# Patient Record
Sex: FEMALE | Race: BLACK OR AFRICAN AMERICAN | Employment: FULL TIME | URBAN - METROPOLITAN AREA
[De-identification: names, ages, dates, MRNs, and addresses within clinical notes are randomized per-mention and may not be internally consistent; named-entity substitution may affect disease eponyms.]

---

## 2018-05-02 ENCOUNTER — OFFICE VISIT (OUTPATIENT)
Dept: INTERNAL MEDICINE | Age: 48
End: 2018-05-02

## 2018-05-02 ENCOUNTER — TELEPHONE (OUTPATIENT)
Dept: DERMATOLOGY | Age: 48
End: 2018-05-02

## 2018-05-02 VITALS
SYSTOLIC BLOOD PRESSURE: 132 MMHG | OXYGEN SATURATION: 98 % | HEIGHT: 62 IN | HEART RATE: 78 BPM | BODY MASS INDEX: 34.04 KG/M2 | DIASTOLIC BLOOD PRESSURE: 88 MMHG | TEMPERATURE: 98.4 F | WEIGHT: 185 LBS

## 2018-05-02 DIAGNOSIS — Z00.00 ROUTINE GENERAL MEDICAL EXAMINATION AT A HEALTH CARE FACILITY: Primary | ICD-10-CM

## 2018-05-02 DIAGNOSIS — B35.4 TINEA CORPORIS: ICD-10-CM

## 2018-05-02 DIAGNOSIS — L73.9 FOLLICULITIS: ICD-10-CM

## 2018-05-02 DIAGNOSIS — N39.3 STRESS INCONTINENCE: ICD-10-CM

## 2018-05-02 PROCEDURE — G8417 CALC BMI ABV UP PARAM F/U: HCPCS | Performed by: NURSE PRACTITIONER

## 2018-05-02 PROCEDURE — 99203 OFFICE O/P NEW LOW 30 MIN: CPT | Performed by: NURSE PRACTITIONER

## 2018-05-02 PROCEDURE — 99386 PREV VISIT NEW AGE 40-64: CPT | Performed by: NURSE PRACTITIONER

## 2018-05-02 PROCEDURE — G8427 DOCREV CUR MEDS BY ELIG CLIN: HCPCS | Performed by: NURSE PRACTITIONER

## 2018-05-02 PROCEDURE — 1036F TOBACCO NON-USER: CPT | Performed by: NURSE PRACTITIONER

## 2018-05-02 RX ORDER — TERBINAFINE HYDROCHLORIDE 250 MG/1
250 TABLET ORAL DAILY
Qty: 14 TABLET | Refills: 0 | Status: SHIPPED | OUTPATIENT
Start: 2018-05-02 | End: 2018-05-16

## 2018-05-02 RX ORDER — PRENATAL VIT 91/IRON/FOLIC/DHA 28-975-200
COMBINATION PACKAGE (EA) ORAL
Qty: 15 G | Refills: 0 | Status: SHIPPED | OUTPATIENT
Start: 2018-05-02 | End: 2022-09-20 | Stop reason: ALTCHOICE

## 2018-05-02 ASSESSMENT — PATIENT HEALTH QUESTIONNAIRE - PHQ9
1. LITTLE INTEREST OR PLEASURE IN DOING THINGS: 0
SUM OF ALL RESPONSES TO PHQ QUESTIONS 1-9: 0
2. FEELING DOWN, DEPRESSED OR HOPELESS: 0
SUM OF ALL RESPONSES TO PHQ9 QUESTIONS 1 & 2: 0

## 2018-05-03 ASSESSMENT — ENCOUNTER SYMPTOMS
DIARRHEA: 0
BLOOD IN STOOL: 0
BLURRED VISION: 0
VOMITING: 0
ORTHOPNEA: 0
EYE PAIN: 0
COUGH: 0
EYE DISCHARGE: 0
WHEEZING: 0
CONSTIPATION: 0
SINUS PAIN: 0
EYE REDNESS: 0
ABDOMINAL PAIN: 0
DOUBLE VISION: 0
HEMOPTYSIS: 0
SPUTUM PRODUCTION: 0
SORE THROAT: 0
HEARTBURN: 0
STRIDOR: 0
BACK PAIN: 0
PHOTOPHOBIA: 0
NAUSEA: 0
SHORTNESS OF BREATH: 0

## 2018-09-03 ENCOUNTER — HOSPITAL ENCOUNTER (EMERGENCY)
Age: 48
Discharge: HOME | End: 2018-09-03
Attending: EMERGENCY MEDICINE
Payer: COMMERCIAL

## 2018-09-03 ENCOUNTER — APPOINTMENT (OUTPATIENT)
Dept: GENERAL RADIOLOGY | Age: 48
End: 2018-09-03
Payer: COMMERCIAL

## 2018-09-03 VITALS
DIASTOLIC BLOOD PRESSURE: 87 MMHG | TEMPERATURE: 98.2 F | RESPIRATION RATE: 18 BRPM | SYSTOLIC BLOOD PRESSURE: 143 MMHG | OXYGEN SATURATION: 98 % | HEART RATE: 89 BPM

## 2018-09-03 DIAGNOSIS — M25.551 PAIN OF RIGHT HIP JOINT: Primary | ICD-10-CM

## 2018-09-03 PROCEDURE — 99283 EMERGENCY DEPT VISIT LOW MDM: CPT

## 2018-09-03 PROCEDURE — 73502 X-RAY EXAM HIP UNI 2-3 VIEWS: CPT

## 2018-09-03 PROCEDURE — 6370000000 HC RX 637 (ALT 250 FOR IP): Performed by: EMERGENCY MEDICINE

## 2018-09-03 RX ORDER — HYDROCODONE BITARTRATE AND ACETAMINOPHEN 5; 325 MG/1; MG/1
1 TABLET ORAL ONCE
Status: COMPLETED | OUTPATIENT
Start: 2018-09-03 | End: 2018-09-03

## 2018-09-03 RX ORDER — NAPROXEN 500 MG/1
500 TABLET ORAL 2 TIMES DAILY WITH MEALS
Qty: 30 TABLET | Refills: 0 | Status: SHIPPED | OUTPATIENT
Start: 2018-09-03 | End: 2022-09-20 | Stop reason: SDUPTHER

## 2018-09-03 RX ADMIN — HYDROCODONE BITARTRATE AND ACETAMINOPHEN 1 TABLET: 5; 325 TABLET ORAL at 18:27

## 2018-09-03 ASSESSMENT — PAIN DESCRIPTION - ORIENTATION: ORIENTATION: RIGHT

## 2018-09-03 ASSESSMENT — PAIN DESCRIPTION - PAIN TYPE: TYPE: ACUTE PAIN

## 2018-09-03 ASSESSMENT — PAIN DESCRIPTION - DESCRIPTORS: DESCRIPTORS: RADIATING

## 2018-09-03 ASSESSMENT — PAIN DESCRIPTION - ONSET: ONSET: PROGRESSIVE

## 2018-09-03 ASSESSMENT — PAIN DESCRIPTION - FREQUENCY: FREQUENCY: CONTINUOUS

## 2018-09-03 ASSESSMENT — PAIN SCALES - GENERAL: PAINLEVEL_OUTOF10: 6

## 2018-09-03 ASSESSMENT — PAIN DESCRIPTION - LOCATION: LOCATION: HIP

## 2018-09-03 NOTE — ED PROVIDER NOTES
normal.   Neck: Normal range of motion. Neck supple. No tracheal deviation present. Cardiovascular: Normal rate, regular rhythm and normal heart sounds. Pulmonary/Chest: Effort normal and breath sounds normal. No respiratory distress. Abdominal: Soft. She exhibits no distension. There is no tenderness. Musculoskeletal: Normal range of motion. She exhibits tenderness. She exhibits no edema or deformity. The right hip does have range of motion, there is no warmth, there is no pain with passive motion, no obvious effusion. There is no ecchymoses. She is mildly tender along the lateral aspect greater trochanter. Neurological: She is alert. No cranial nerve deficit. She exhibits normal muscle tone. Skin: Skin is warm and dry. Psychiatric: She has a normal mood and affect. Her behavior is normal.   Nursing note and vitals reviewed. Diagnostic Results       RADIOLOGY:  XR HIP 2-3 VW W PELVIS RIGHT   Final Result      No evidence of acute osseous abnormality. LABS:   No results found for this visit on 09/03/18. RECENT VITALS:   ,  ,  ,  ,       Procedures       ED Course     Nursing Notes, Past Medical Hx, Past Surgical Hx, Social Hx, Allergies, and Family Hx were reviewed. The patient was given the following medications:  Orders Placed This Encounter   Medications    HYDROcodone-acetaminophen (NORCO) 5-325 MG per tablet 1 tablet    naproxen (NAPROSYN) 500 MG tablet     Sig: Take 1 tablet by mouth 2 times daily (with meals)     Dispense:  30 tablet     Refill:  0       CONSULTS:  None    MEDICAL DECISION MAKING / ASSESSMENT / Atilio Matt is a 50 y.o. female who presents with hip pain. At this time she has no obvious signs of trauma. She is neurovascularly intact. There is a low suspicion for septic joint on her. She is afebrile and otherwise very well-appearing. Her x-rays are unremarkable.   At this time she will likely be treated as a bursitis, she will be sent home on naproxen regimen, PCP follow-up and return precautions given upon discharge    This patient was also evaluated by the attending physician. All care plans were discussed and agreed upon. Clinical Impression     1.  Pain of right hip joint        Disposition     PATIENT REFERRED TO:  [facility]  [address] [place] [address]  [phone]    As needed, If symptoms worsen      DISCHARGE MEDICATIONS:  New Prescriptions    NAPROXEN (NAPROSYN) 500 MG TABLET    Take 1 tablet by mouth 2 times daily (with meals)       DISPOSITION Decision To Discharge 09/03/2018 06:46:01 PM       Rox Castellano MD  Resident  09/03/18 [id number]

## 2018-09-03 NOTE — ED PROVIDER NOTES
ED Attending Attestation Note     Date of evaluation: 9/3/2018    This patient was seen by the resident. I have seen and examined the patient, agree with the workup, evaluation, management and diagnosis. The care plan has been discussed. My assessment reveals Well-appearing female presenting with right hip pain that atraumatic. Exam reveals full range of motion in the right hip without concern for septic joint. Her abdominal exam is benign. She does not have any signs of bulge consistent with femoral hernia. Her pulses are intact. She is tender over the right hip may be consistent with a bursitis.      Sherman Mccracken III, MD  09/03/18 2549

## 2018-11-14 ENCOUNTER — APPOINTMENT (OUTPATIENT)
Dept: GENERAL RADIOLOGY | Age: 48
End: 2018-11-14
Payer: COMMERCIAL

## 2018-11-14 ENCOUNTER — HOSPITAL ENCOUNTER (EMERGENCY)
Age: 48
Discharge: HOME | End: 2018-11-14
Attending: EMERGENCY MEDICINE
Payer: COMMERCIAL

## 2018-11-14 VITALS
BODY MASS INDEX: 34.78 KG/M2 | OXYGEN SATURATION: 100 % | WEIGHT: 189 LBS | HEART RATE: 88 BPM | TEMPERATURE: 98.2 F | DIASTOLIC BLOOD PRESSURE: 89 MMHG | HEIGHT: 62 IN | SYSTOLIC BLOOD PRESSURE: 154 MMHG | RESPIRATION RATE: 20 BRPM

## 2018-11-14 DIAGNOSIS — R03.0 ELEVATED BLOOD PRESSURE READING: ICD-10-CM

## 2018-11-14 DIAGNOSIS — H81.10 BENIGN PAROXYSMAL POSITIONAL VERTIGO, UNSPECIFIED LATERALITY: ICD-10-CM

## 2018-11-14 DIAGNOSIS — K59.00 CONSTIPATION, UNSPECIFIED CONSTIPATION TYPE: Primary | ICD-10-CM

## 2018-11-14 LAB
BACTERIA: ABNORMAL /HPF
BILIRUBIN URINE: NEGATIVE
BLOOD, URINE: ABNORMAL
CLARITY: CLEAR
COLOR: YELLOW
EPITHELIAL CELLS, UA: ABNORMAL /HPF
GLUCOSE URINE: NEGATIVE MG/DL
KETONES, URINE: NEGATIVE MG/DL
LEUKOCYTE ESTERASE, URINE: NEGATIVE
MICROSCOPIC EXAMINATION: YES
MUCUS: ABNORMAL /LPF
NITRITE, URINE: NEGATIVE
PH UA: 6
PROTEIN UA: NEGATIVE MG/DL
RBC UA: ABNORMAL /HPF (ref 0–2)
SPECIFIC GRAVITY UA: 1.02
URINE TYPE: ABNORMAL
UROBILINOGEN, URINE: 0.2 E.U./DL
WBC UA: ABNORMAL /HPF (ref 0–5)

## 2018-11-14 PROCEDURE — 99284 EMERGENCY DEPT VISIT MOD MDM: CPT

## 2018-11-14 PROCEDURE — 6370000000 HC RX 637 (ALT 250 FOR IP): Performed by: EMERGENCY MEDICINE

## 2018-11-14 PROCEDURE — 81001 URINALYSIS AUTO W/SCOPE: CPT

## 2018-11-14 PROCEDURE — 74018 RADEX ABDOMEN 1 VIEW: CPT

## 2018-11-14 RX ORDER — MECLIZINE HYDROCHLORIDE 25 MG/1
25 TABLET ORAL 3 TIMES DAILY PRN
Qty: 30 TABLET | Refills: 1 | Status: SHIPPED | OUTPATIENT
Start: 2018-11-14 | End: 2018-11-24

## 2018-11-14 RX ORDER — POLYETHYLENE GLYCOL 3350 17 G/17G
17 POWDER, FOR SOLUTION ORAL DAILY
Qty: 225 G | Refills: 1 | Status: SHIPPED | OUTPATIENT
Start: 2018-11-14 | End: 2018-11-17

## 2018-11-14 RX ADMIN — IBUPROFEN 600 MG: 400 TABLET ORAL at 17:16

## 2018-11-14 RX ADMIN — LIDOCAINE HYDROCHLORIDE: 20 SOLUTION ORAL; TOPICAL at 17:16

## 2018-11-14 ASSESSMENT — PAIN DESCRIPTION - LOCATION: LOCATION: ABDOMEN;HEAD

## 2018-11-14 ASSESSMENT — PAIN SCALES - GENERAL
PAINLEVEL_OUTOF10: 7
PAINLEVEL_OUTOF10: 4

## 2018-11-14 ASSESSMENT — PAIN DESCRIPTION - FREQUENCY: FREQUENCY: INTERMITTENT

## 2018-11-14 ASSESSMENT — PAIN DESCRIPTION - PAIN TYPE: TYPE: ACUTE PAIN

## 2018-11-14 ASSESSMENT — PAIN DESCRIPTION - DESCRIPTORS: DESCRIPTORS: SHARP;PRESSURE;SHOOTING

## 2018-11-14 ASSESSMENT — PAIN DESCRIPTION - ORIENTATION: ORIENTATION: LOWER

## 2018-11-16 NOTE — ED PROVIDER NOTES
TRIAGE CHIEF COMPLAINT:   Chief Complaint   Patient presents with    Abdominal Pain       HPI: Nayana Billy is a 46 y.o. female who presents to the emergency department with Several different complaints. She states she has had some low abdominal crampy pain for the past 3 days and feels she is constipated. She has not had a good bowel movement in 4 or 5 days. She has nausea but no vomiting. Denies melena or rectal bleeding. She denies fever or chills. She has had problems with constipation in the past.  The patient states she has had some occasional urinary incontinence over the last week without dysuria frequency or hematuria. Denies flank pain. She has never had any abdominal surgery. She states that she developed a gradual onset headache mostly at the top of her head but also diffuse earlier today. It was not thunderclap in origin. She has no neck pain. Denies numbness or weakness. She has no vision or speech complaint. No gait difficulty. She states for 5 or 6 days she's been having some dizziness with occasional vertigo. Symptoms seem to worsen with head movement. She states she has a history of on and off vertigo usually in the winter time which medication helps but she does not have the medication. REVIEW OF SYSTEMS:   10 systems reviewed. Pertinent positives per HPI. Otherwise noted to be negative. I have reviewed the triage/nursing documentation and agree unless otherwise noted below. PAST MEDICAL HISTORY:   History reviewed. No pertinent past medical history. CURRENT MEDICATIONS:   Discharge Medication List as of 11/14/2018  6:42 PM      START taking these medications    Details   polyethylene glycol (MIRALAX) powder Take 17 g by mouth daily for 3 days Then use every 2-3 days to prevent constipation. , Disp-225 g, R-1Print      meclizine (ANTIVERT) 25 MG tablet Take 1 tablet by mouth 3 times daily as needed for Dizziness, Disp-30 tablet, R-1Print         CONTINUE these tenderness  Neurologic:  Alert & oriented x 3, Speech is clear and appropriate, No upper extremity drift or lower extremity weakness,  Normal sensory function, No facial asymmetry,  No truncal or extremity ataxia. Normal gait. Skin:  Warm, Dry, No erythema, No rash  Psychiatric:  Affect normal, Mood normal      EKG:    EKG interpreted by myself. Radiology:  XR ABDOMEN (KUB) (SINGLE AP VIEW)   Final Result      No intestinal obstruction. LAB  Labs Reviewed   URINALYSIS - Abnormal; Notable for the following:        Result Value    Blood, Urine MODERATE (*)     All other components within normal limits    Narrative:     Performed at:  Critical access hospital  [address],  [facility]   Phone [phone]   MICROSCOPIC URINALYSIS - Abnormal; Notable for the following:     Mucus, UA 1+ (*)     RBC, UA 3-5 (*)     Bacteria, UA Rare (*)     All other components within normal limits    Narrative:     Performed at:  Critical access hospital  [address],  [facility]   Phone [phone]       ED COURSE & MEDICAL DECISION MAKING:  Pertinent Labs & Imaging studies reviewed. (See chart for details)  64-year-old female with several different complaints. She has some low abdominal pain. Pressure pain and feels she is constipated. Abdomen is benign. Bowel sounds are normal.  Abdominal x-ray shows stool throughout the colon but no obstruction. She complained of some intermittent urinary incontinence without dysuria, frequency or hematuria. Urinalysis was normal.  No flank pain or tenderness. She complains of gradual onset headache which was not thunderclap in origin. She has some dizziness and positional vertigo which is a recurrent problem. The patient is neurologically intact. No clinical evidence for posterior circulation abnormality, TIA, stroke, subarachnoid hemorrhage or meningitis.   I did not feel brain imaging was

## 2019-01-20 ENCOUNTER — HOSPITAL ENCOUNTER (EMERGENCY)
Age: 49
Discharge: HOME | End: 2019-01-20
Attending: EMERGENCY MEDICINE
Payer: COMMERCIAL

## 2019-01-20 VITALS
RESPIRATION RATE: 15 BRPM | BODY MASS INDEX: 34.04 KG/M2 | WEIGHT: 185 LBS | OXYGEN SATURATION: 98 % | TEMPERATURE: 98.2 F | HEART RATE: 90 BPM | SYSTOLIC BLOOD PRESSURE: 186 MMHG | DIASTOLIC BLOOD PRESSURE: 109 MMHG | HEIGHT: 62 IN

## 2019-01-20 DIAGNOSIS — L03.032 CELLULITIS OF TOE OF LEFT FOOT: Primary | ICD-10-CM

## 2019-01-20 PROCEDURE — 99283 EMERGENCY DEPT VISIT LOW MDM: CPT

## 2019-01-20 RX ORDER — SULFAMETHOXAZOLE AND TRIMETHOPRIM 800; 160 MG/1; MG/1
1 TABLET ORAL 2 TIMES DAILY
Qty: 20 TABLET | Refills: 0 | Status: SHIPPED | OUTPATIENT
Start: 2019-01-20 | End: 2019-01-27

## 2019-01-20 RX ORDER — GUAIFENESIN 600 MG/1
600 TABLET, EXTENDED RELEASE ORAL 2 TIMES DAILY
Qty: 30 TABLET | Refills: 0 | Status: SHIPPED | OUTPATIENT
Start: 2019-01-20 | End: 2019-02-04

## 2019-01-20 ASSESSMENT — PAIN DESCRIPTION - PAIN TYPE: TYPE: ACUTE PAIN

## 2019-01-20 ASSESSMENT — PAIN SCALES - GENERAL: PAINLEVEL_OUTOF10: 3

## 2019-01-20 ASSESSMENT — ENCOUNTER SYMPTOMS
ABDOMINAL DISTENTION: 0
WHEEZING: 0
EYE REDNESS: 0
VOMITING: 0
SORE THROAT: 0
NAUSEA: 0
SHORTNESS OF BREATH: 0

## 2019-01-20 ASSESSMENT — PAIN DESCRIPTION - LOCATION: LOCATION: FOOT

## 2019-02-04 ENCOUNTER — OFFICE VISIT (OUTPATIENT)
Dept: INTERNAL MEDICINE CLINIC | Age: 49
End: 2019-02-04
Payer: COMMERCIAL

## 2019-02-04 VITALS
BODY MASS INDEX: 35.33 KG/M2 | OXYGEN SATURATION: 100 % | SYSTOLIC BLOOD PRESSURE: 128 MMHG | HEART RATE: 68 BPM | WEIGHT: 192 LBS | HEIGHT: 62 IN | DIASTOLIC BLOOD PRESSURE: 86 MMHG

## 2019-02-04 DIAGNOSIS — N63.15 BREAST LUMP ON RIGHT SIDE AT 6 O'CLOCK POSITION: ICD-10-CM

## 2019-02-04 DIAGNOSIS — N63.10 BREAST MASS, RIGHT: ICD-10-CM

## 2019-02-04 DIAGNOSIS — L81.9 DISCOLORATION OF SKIN: Primary | ICD-10-CM

## 2019-02-04 DIAGNOSIS — L81.9 DISCOLORATION OF SKIN OF MULTIPLE SITES: ICD-10-CM

## 2019-02-04 DIAGNOSIS — N39.3 STRESS INCONTINENCE IN FEMALE: ICD-10-CM

## 2019-02-04 PROCEDURE — G8417 CALC BMI ABV UP PARAM F/U: HCPCS | Performed by: NURSE PRACTITIONER

## 2019-02-04 PROCEDURE — 99213 OFFICE O/P EST LOW 20 MIN: CPT | Performed by: NURSE PRACTITIONER

## 2019-02-04 PROCEDURE — 1036F TOBACCO NON-USER: CPT | Performed by: NURSE PRACTITIONER

## 2019-02-04 PROCEDURE — G8427 DOCREV CUR MEDS BY ELIG CLIN: HCPCS | Performed by: NURSE PRACTITIONER

## 2019-02-04 PROCEDURE — 3017F COLORECTAL CA SCREEN DOC REV: CPT | Performed by: NURSE PRACTITIONER

## 2019-02-04 PROCEDURE — G8484 FLU IMMUNIZE NO ADMIN: HCPCS | Performed by: NURSE PRACTITIONER

## 2019-02-04 ASSESSMENT — PATIENT HEALTH QUESTIONNAIRE - PHQ9
SUM OF ALL RESPONSES TO PHQ QUESTIONS 1-9: 0
1. LITTLE INTEREST OR PLEASURE IN DOING THINGS: 0
SUM OF ALL RESPONSES TO PHQ QUESTIONS 1-9: 0
SUM OF ALL RESPONSES TO PHQ9 QUESTIONS 1 & 2: 0
2. FEELING DOWN, DEPRESSED OR HOPELESS: 0

## 2019-02-04 ASSESSMENT — ENCOUNTER SYMPTOMS
SINUS PAIN: 0
SHORTNESS OF BREATH: 0
WHEEZING: 0
COUGH: 0
COLOR CHANGE: 0
SINUS PRESSURE: 0

## 2019-02-05 ENCOUNTER — HOSPITAL ENCOUNTER (OUTPATIENT)
Dept: MAMMOGRAPHY | Age: 49
Discharge: HOME | End: 2019-02-05
Payer: COMMERCIAL

## 2019-02-05 ENCOUNTER — HOSPITAL ENCOUNTER (OUTPATIENT)
Dept: ULTRASOUND IMAGING | Age: 49
Discharge: HOME | End: 2019-02-05
Payer: COMMERCIAL

## 2019-02-05 DIAGNOSIS — N63.15 BREAST LUMP ON RIGHT SIDE AT 6 O'CLOCK POSITION: ICD-10-CM

## 2019-02-05 DIAGNOSIS — N63.0 LUMP IN FEMALE BREAST: ICD-10-CM

## 2019-02-05 PROCEDURE — G0279 TOMOSYNTHESIS, MAMMO: HCPCS

## 2019-02-05 PROCEDURE — 76642 ULTRASOUND BREAST LIMITED: CPT

## 2019-02-12 ENCOUNTER — OFFICE VISIT (OUTPATIENT)
Dept: DERMATOLOGY | Age: 49
End: 2019-02-12
Payer: COMMERCIAL

## 2019-02-12 DIAGNOSIS — L81.0 POST-INFLAMMATORY HYPERPIGMENTATION: Primary | ICD-10-CM

## 2019-02-12 PROCEDURE — 99203 OFFICE O/P NEW LOW 30 MIN: CPT | Performed by: DERMATOLOGY

## 2019-02-12 RX ORDER — HYDROQUINONE 40 MG/G
CREAM TOPICAL
Qty: 30 G | Refills: 2 | Status: SHIPPED | OUTPATIENT
Start: 2019-02-12 | End: 2020-07-02 | Stop reason: SDUPTHER

## 2019-03-19 ENCOUNTER — OFFICE VISIT (OUTPATIENT)
Dept: INTERNAL MEDICINE CLINIC | Age: 49
End: 2019-03-19
Payer: COMMERCIAL

## 2019-03-19 VITALS
BODY MASS INDEX: 35.88 KG/M2 | WEIGHT: 195 LBS | DIASTOLIC BLOOD PRESSURE: 82 MMHG | HEART RATE: 70 BPM | SYSTOLIC BLOOD PRESSURE: 138 MMHG | HEIGHT: 62 IN | OXYGEN SATURATION: 99 %

## 2019-03-19 DIAGNOSIS — N39.3 STRESS INCONTINENCE IN FEMALE: ICD-10-CM

## 2019-03-19 PROCEDURE — 99243 OFF/OP CNSLTJ NEW/EST LOW 30: CPT | Performed by: NURSE PRACTITIONER

## 2019-03-19 PROCEDURE — 3017F COLORECTAL CA SCREEN DOC REV: CPT | Performed by: NURSE PRACTITIONER

## 2019-03-19 PROCEDURE — G8427 DOCREV CUR MEDS BY ELIG CLIN: HCPCS | Performed by: NURSE PRACTITIONER

## 2019-03-19 PROCEDURE — G8484 FLU IMMUNIZE NO ADMIN: HCPCS | Performed by: NURSE PRACTITIONER

## 2019-03-19 PROCEDURE — G8417 CALC BMI ABV UP PARAM F/U: HCPCS | Performed by: NURSE PRACTITIONER

## 2019-03-19 RX ORDER — SULFAMETHOXAZOLE AND TRIMETHOPRIM 800; 160 MG/1; MG/1
1 TABLET ORAL 2 TIMES DAILY
Qty: 14 TABLET | Refills: 0 | Status: SHIPPED | OUTPATIENT
Start: 2019-03-19 | End: 2019-03-26

## 2019-03-19 ASSESSMENT — ENCOUNTER SYMPTOMS
COUGH: 0
EYE REDNESS: 0
EYE ITCHING: 0
RHINORRHEA: 0
COLOR CHANGE: 0
SHORTNESS OF BREATH: 0
BLOOD IN STOOL: 0
BACK PAIN: 0
SORE THROAT: 0
DIARRHEA: 0
SINUS PRESSURE: 0
NAUSEA: 0
WHEEZING: 0
CONSTIPATION: 0
ABDOMINAL PAIN: 0
CHEST TIGHTNESS: 0
VOMITING: 0

## 2019-03-19 ASSESSMENT — PATIENT HEALTH QUESTIONNAIRE - PHQ9
1. LITTLE INTEREST OR PLEASURE IN DOING THINGS: 0
SUM OF ALL RESPONSES TO PHQ9 QUESTIONS 1 & 2: 0
2. FEELING DOWN, DEPRESSED OR HOPELESS: 0
SUM OF ALL RESPONSES TO PHQ QUESTIONS 1-9: 0
SUM OF ALL RESPONSES TO PHQ QUESTIONS 1-9: 0

## 2019-06-05 ENCOUNTER — APPOINTMENT (OUTPATIENT)
Dept: GENERAL RADIOLOGY | Age: 49
End: 2019-06-05
Payer: COMMERCIAL

## 2019-06-05 ENCOUNTER — HOSPITAL ENCOUNTER (EMERGENCY)
Age: 49
Discharge: HOME | End: 2019-06-05
Attending: EMERGENCY MEDICINE
Payer: COMMERCIAL

## 2019-06-05 VITALS
SYSTOLIC BLOOD PRESSURE: 140 MMHG | DIASTOLIC BLOOD PRESSURE: 70 MMHG | OXYGEN SATURATION: 97 % | HEIGHT: 62 IN | RESPIRATION RATE: 16 BRPM | WEIGHT: 196.2 LBS | BODY MASS INDEX: 36.1 KG/M2 | TEMPERATURE: 98.1 F | HEART RATE: 72 BPM

## 2019-06-05 DIAGNOSIS — R07.89 RIGHT-SIDED CHEST WALL PAIN: ICD-10-CM

## 2019-06-05 DIAGNOSIS — R10.11 ABDOMINAL PAIN, RIGHT UPPER QUADRANT: Primary | ICD-10-CM

## 2019-06-05 LAB
A/G RATIO: 1.4 (ref 1.1–2.2)
ALBUMIN SERPL-MCNC: 4.4 G/DL (ref 3.4–5)
ALP BLD-CCNC: 69 U/L (ref 40–129)
ALT SERPL-CCNC: 10 U/L (ref 10–40)
ANION GAP SERPL CALCULATED.3IONS-SCNC: 10 MMOL/L (ref 3–16)
AST SERPL-CCNC: 23 U/L (ref 15–37)
BACTERIA: ABNORMAL /HPF
BASOPHILS ABSOLUTE: 0 K/UL (ref 0–0.2)
BASOPHILS RELATIVE PERCENT: 0.4 %
BILIRUB SERPL-MCNC: <0.2 MG/DL (ref 0–1)
BILIRUBIN URINE: NEGATIVE
BLOOD, URINE: ABNORMAL
BUN BLDV-MCNC: 15 MG/DL (ref 7–20)
CALCIUM SERPL-MCNC: 9.3 MG/DL (ref 8.3–10.6)
CHLORIDE BLD-SCNC: 107 MMOL/L (ref 99–110)
CLARITY: CLEAR
CO2: 23 MMOL/L (ref 21–32)
COLOR: YELLOW
CREAT SERPL-MCNC: 0.8 MG/DL (ref 0.6–1.1)
D DIMER: <200 NG/ML DDU (ref 0–229)
EKG ATRIAL RATE: 69 BPM
EKG DIAGNOSIS: NORMAL
EKG P AXIS: 52 DEGREES
EKG P-R INTERVAL: 146 MS
EKG Q-T INTERVAL: 426 MS
EKG QRS DURATION: 78 MS
EKG QTC CALCULATION (BAZETT): 456 MS
EKG R AXIS: 12 DEGREES
EKG T AXIS: 50 DEGREES
EKG VENTRICULAR RATE: 69 BPM
EOSINOPHILS ABSOLUTE: 0.2 K/UL (ref 0–0.6)
EOSINOPHILS RELATIVE PERCENT: 2.3 %
EPITHELIAL CELLS, UA: ABNORMAL /HPF
GFR AFRICAN AMERICAN: >60
GFR NON-AFRICAN AMERICAN: >60
GLOBULIN: 3.1 G/DL
GLUCOSE BLD-MCNC: 91 MG/DL (ref 70–99)
GLUCOSE URINE: NEGATIVE MG/DL
HCG(URINE) PREGNANCY TEST: NEGATIVE
HCT VFR BLD CALC: 36.7 % (ref 36–48)
HEMOGLOBIN: 12 G/DL (ref 12–16)
KETONES, URINE: NEGATIVE MG/DL
LEUKOCYTE ESTERASE, URINE: NEGATIVE
LIPASE: 28 U/L (ref 13–60)
LYMPHOCYTES ABSOLUTE: 3.9 K/UL (ref 1–5.1)
LYMPHOCYTES RELATIVE PERCENT: 36.7 %
MCH RBC QN AUTO: 30.3 PG (ref 26–34)
MCHC RBC AUTO-ENTMCNC: 32.7 G/DL (ref 31–36)
MCV RBC AUTO: 92.6 FL (ref 80–100)
MICROSCOPIC EXAMINATION: YES
MONOCYTES ABSOLUTE: 0.9 K/UL (ref 0–1.3)
MONOCYTES RELATIVE PERCENT: 8 %
NEUTROPHILS ABSOLUTE: 5.7 K/UL (ref 1.7–7.7)
NEUTROPHILS RELATIVE PERCENT: 52.6 %
NITRITE, URINE: NEGATIVE
PDW BLD-RTO: 14.7 % (ref 12.4–15.4)
PH UA: 5.5 (ref 5–8)
PLATELET # BLD: 258 K/UL (ref 135–450)
PMV BLD AUTO: 7.5 FL (ref 5–10.5)
POTASSIUM SERPL-SCNC: 4.1 MMOL/L (ref 3.5–5.1)
PROTEIN UA: NEGATIVE MG/DL
RBC # BLD: 3.97 M/UL (ref 4–5.2)
RBC UA: ABNORMAL /HPF (ref 0–2)
SODIUM BLD-SCNC: 140 MMOL/L (ref 136–145)
SPECIFIC GRAVITY UA: 1.02 (ref 1–1.03)
TOTAL PROTEIN: 7.5 G/DL (ref 6.4–8.2)
URINE REFLEX TO CULTURE: ABNORMAL
URINE TYPE: ABNORMAL
UROBILINOGEN, URINE: 0.2 E.U./DL
WBC # BLD: 10.8 K/UL (ref 4–11)
WBC UA: ABNORMAL /HPF (ref 0–5)

## 2019-06-05 PROCEDURE — 93010 ELECTROCARDIOGRAM REPORT: CPT | Performed by: INTERNAL MEDICINE

## 2019-06-05 PROCEDURE — 84703 CHORIONIC GONADOTROPIN ASSAY: CPT

## 2019-06-05 PROCEDURE — 81001 URINALYSIS AUTO W/SCOPE: CPT

## 2019-06-05 PROCEDURE — 85379 FIBRIN DEGRADATION QUANT: CPT

## 2019-06-05 PROCEDURE — 96374 THER/PROPH/DIAG INJ IV PUSH: CPT

## 2019-06-05 PROCEDURE — 93005 ELECTROCARDIOGRAM TRACING: CPT | Performed by: EMERGENCY MEDICINE

## 2019-06-05 PROCEDURE — 71046 X-RAY EXAM CHEST 2 VIEWS: CPT

## 2019-06-05 PROCEDURE — 99285 EMERGENCY DEPT VISIT HI MDM: CPT

## 2019-06-05 PROCEDURE — 6360000002 HC RX W HCPCS: Performed by: EMERGENCY MEDICINE

## 2019-06-05 PROCEDURE — 85025 COMPLETE CBC W/AUTO DIFF WBC: CPT

## 2019-06-05 PROCEDURE — 83690 ASSAY OF LIPASE: CPT

## 2019-06-05 PROCEDURE — 80053 COMPREHEN METABOLIC PANEL: CPT

## 2019-06-05 RX ORDER — KETOROLAC TROMETHAMINE 30 MG/ML
30 INJECTION, SOLUTION INTRAMUSCULAR; INTRAVENOUS ONCE
Status: COMPLETED | OUTPATIENT
Start: 2019-06-05 | End: 2019-06-05

## 2019-06-05 RX ORDER — NAPROXEN 500 MG/1
500 TABLET ORAL 2 TIMES DAILY
Qty: 20 TABLET | Refills: 0 | Status: SHIPPED | OUTPATIENT
Start: 2019-06-05 | End: 2022-09-20 | Stop reason: ALTCHOICE

## 2019-06-05 RX ORDER — METHOCARBAMOL 750 MG/1
750 TABLET, FILM COATED ORAL 3 TIMES DAILY
Qty: 15 TABLET | Refills: 0 | Status: SHIPPED | OUTPATIENT
Start: 2019-06-05 | End: 2019-06-10

## 2019-06-05 RX ADMIN — KETOROLAC TROMETHAMINE 30 MG: 30 INJECTION, SOLUTION INTRAMUSCULAR at 01:46

## 2019-06-05 ASSESSMENT — PAIN DESCRIPTION - DESCRIPTORS: DESCRIPTORS: SHARP

## 2019-06-05 ASSESSMENT — PAIN DESCRIPTION - PAIN TYPE: TYPE: ACUTE PAIN

## 2019-06-05 ASSESSMENT — PAIN DESCRIPTION - FREQUENCY: FREQUENCY: INTERMITTENT

## 2019-06-05 ASSESSMENT — PAIN SCALES - GENERAL
PAINLEVEL_OUTOF10: 6
PAINLEVEL_OUTOF10: 5
PAINLEVEL_OUTOF10: 0

## 2019-06-05 ASSESSMENT — PAIN DESCRIPTION - LOCATION: LOCATION: RIB CAGE

## 2019-06-05 NOTE — ED PROVIDER NOTES
[facility]  EMERGENCY DEPT VISIT      Patient Identification  Franklin Flanagan is a 46 y.o. female. Chief Complaint   Rib Pain (right sided rib pain for 4 days has been doing exercise)      History of Present Illness: This is a  46 y.o. female who presents ambulatory to the ED with complaints of 3-4 day history of right upper quadrant and right side pain. Patient states the pain is only worse when she takes a deep breath or yawns. She does not feel short of breath. It is not increased postprandially. She's had occasional nausea but no vomiting. No fever. No cough. She had one episode of diarrhea a few days ago but none since then. She has been working out but mostly doing squats and leg exercises rather than upper body exercises or abdominal workouts. She has taken no medications for the pain. It is rated 7 out of 10 when she takes deep breath but minimal to gone when sitting still. She is not on any estrogens. No recent travel or immobilization. No history of DVT or PE. No leg pain or swelling. No frequency, urgency, hematuria. History reviewed. No pertinent past medical history. Past Surgical History:   Procedure Laterality Date    CARPAL TUNNEL RELEASE Bilateral        No current facility-administered medications for this encounter. Current Outpatient Medications:     naproxen (NAPROSYN) 500 MG tablet, Take 1 tablet by mouth 2 times daily for 10 days, Disp: 20 tablet, Rfl: 0    methocarbamol (ROBAXIN-750) 750 MG tablet, Take 1 tablet by mouth 3 times daily for 5 days, Disp: 15 tablet, Rfl: 0    hydroquinone 4 % cream, Apply topically 2 times daily. , Disp: 30 g, Rfl: 2    naproxen (NAPROSYN) 500 MG tablet, Take 1 tablet by mouth 2 times daily (with meals), Disp: 30 tablet, Rfl: 0    terbinafine (ATHLETES FOOT) 1 % cream, Apply topically 2 times daily. , Disp: 15 g, Rfl: 0    ibuprofen (ADVIL;MOTRIN) 800 MG tablet, Take 1 tablet by mouth every 8 hours as needed for Pain, Disp: 30 tablet, Rfl: 0    Allergies   Allergen Reactions    Shellfish-Derived Products Hives       Social History     Socioeconomic History    Marital status: Single     Spouse name: Not on file    Number of children: Not on file    Years of education: Not on file    Highest education level: Not on file   Occupational History    Not on file   Social Needs    Financial resource strain: Not on file    Food insecurity:     Worry: Not on file     Inability: Not on file    Transportation needs:     Medical: Not on file     Non-medical: Not on file   Tobacco Use    Smoking status: Never Smoker    Smokeless tobacco: Never Used   Substance and Sexual Activity    Alcohol use: No     Comment: occ    Drug use: No    Sexual activity: Never   Lifestyle    Physical activity:     Days per week: Not on file     Minutes per session: Not on file    Stress: Not on file   Relationships    Social connections:     Talks on phone: Not on file     Gets together: Not on file     Attends Sikhism service: Not on file     Active member of club or organization: Not on file     Attends meetings of clubs or organizations: Not on file     Relationship status: Not on file    Intimate partner violence:     Fear of current or ex partner: Not on file     Emotionally abused: Not on file     Physically abused: Not on file     Forced sexual activity: Not on file   Other Topics Concern    Not on file   Social History Narrative    Not on file       Nursing Notes Reviewed      ROS:  GENERAL:  No fever, no chills, no diaphoresis, no appetite changes  EYES: no eye discharge, no eye redness, no visual changes  ENT: no nasal congestion, no sore throat  CARDIAC: + chest pain,  no leg swelling  PULM: no cough, no shortness of breath  ABD: + abdominal pain, no nausea, no vomiting, no diarrhea, no melena or hematochezia  : no dysuria, no hematuria, no urgency, no frequency.  + flank pain  MUSCULOSKELETAL: no back pain, no arthralgias, no myalgias  NEURO: no headache, no lightheadedness, no dizziness, no numbness, no weakness, no syncope  SKIN: no rashes, no erythema, no wounds, no ecchymosis      PHYSICAL EXAM:  GENERAL APPEARANCE: Camelia Figueroa is in no acute respiratory distress. Awake and alert. VITAL SIGNS:   ED Triage Vitals [06/05/19 0059]   Enc Vitals Group      BP (!) 159/91      Pulse 76      Resp 14      Temp 98.1 °F (36.7 °C)      Temp Source Oral      SpO2 97 %      Weight 196 lb 3.2 oz (89 kg)      Height 5' 2\" (1.575 m)      Head Circumference       Peak Flow       Pain Score       Pain Loc       Pain Edu? Excl. in [address]? HEAD: Normocephalic, atraumatic. EYES:  Extraocular muscles are intact. Pupils equal round and reactive to light. Conjunctivas are pink. Negative scleral icterus. ENT:  Mucous membranes are moist.  Pharynx without erythema or exudates. NECK: Nontender and supple. No cervical adenopathy. CHEST:  Clear to auscultation bilaterally. No rales, rhonchi, or wheezing. HEART:  Regular rate and regular rhythm. No murmurs. Strong and equal pulses in the upper and lower extremities. ABDOMEN: Soft,  nondistended, positive bowel sounds. abdomen is tender in RUQ and right flank. No rebound. no guarding. MUSCULOSKELETAL: The calves are nontender to palpation. Active range of motion of the upper and lower extremities. No edema. NEUROLOGICAL: Awake, alert and oriented x 3. Power intact in the upper and lower extremities. DERMATOLOGIC: No petechiae, rashes, or ecchymoses. No erythema. PSYCH: normal mood and affect. Normal thought content. ED COURSE AND MEDICAL DECISION MAKING:    EKG as interpreted by myself:  normal sinus rhythm with a rate of 69  Axis is   Normal  QTc is  normal  Intervals and Durations are unremarkable. No specific ST-T wave changes appreciated. No evidence of acute ischemia.    No prior EKG    Radiology:  Films have been read by radiologist as noted in chart unless otherwise stated. Other radiologic studies (i.e. CT, MRI, ultrasounds, etc ) have been interpreted by radiologist.     XR CHEST STANDARD (2 VW)   Final Result     No acute findings.           US GALLBLADDER RUQ    (Results Pending)       Labs:  Results for orders placed or performed during the hospital encounter of 06/05/19   CBC Auto Differential   Result Value Ref Range    WBC 10.8 4.0 - 11.0 K/uL    RBC 3.97 (L) 4.00 - 5.20 M/uL    Hemoglobin 12.0 12.0 - 16.0 g/dL    Hematocrit 36.7 36.0 - 48.0 %    MCV 92.6 80.0 - 100.0 fL    MCH 30.3 26.0 - 34.0 pg    MCHC 32.7 31.0 - 36.0 g/dL    RDW 14.7 12.4 - 15.4 %    Platelets 814 733 - 665 K/uL    MPV 7.5 5.0 - 10.5 fL    Neutrophils % 52.6 %    Lymphocytes % 36.7 %    Monocytes % 8.0 %    Eosinophils % 2.3 %    Basophils % 0.4 %    Neutrophils # 5.7 1.7 - 7.7 K/uL    Lymphocytes # 3.9 1.0 - 5.1 K/uL    Monocytes # 0.9 0.0 - 1.3 K/uL    Eosinophils # 0.2 0.0 - 0.6 K/uL    Basophils # 0.0 0.0 - 0.2 K/uL   Comprehensive Metabolic Panel   Result Value Ref Range    Sodium 140 136 - 145 mmol/L    Potassium 4.1 3.5 - 5.1 mmol/L    Chloride 107 99 - 110 mmol/L    CO2 23 21 - 32 mmol/L    Anion Gap 10 3 - 16    Glucose 91 70 - 99 mg/dL    BUN 15 7 - 20 mg/dL    CREATININE 0.8 0.6 - 1.1 mg/dL    GFR Non-African American >60 >60    GFR African American >60 >60    Calcium 9.3 8.3 - 10.6 mg/dL    Total Protein 7.5 6.4 - 8.2 g/dL    Alb 4.4 3.4 - 5.0 g/dL    Albumin/Globulin Ratio 1.4 1.1 - 2.2    Total Bilirubin <0.2 0.0 - 1.0 mg/dL    Alkaline Phosphatase 69 40 - 129 U/L    ALT 10 10 - 40 U/L    AST 23 15 - 37 U/L    Globulin 3.1 g/dL   Lipase   Result Value Ref Range    Lipase 28.0 13.0 - 60.0 U/L   D-Dimer, Quantitative   Result Value Ref Range    D-Dimer, Quant <200 0 - 229 ng/mL DDU   Urinalysis Reflex to Culture   Result Value Ref Range    Color, UA Yellow Straw/Yellow    Clarity, UA Clear Clear    Glucose, Ur Negative Negative mg/dL    Bilirubin Urine Negative Negative    Ketones, Urine Negative Negative mg/dL    Specific Gravity, UA 1.020 1.005 - 1.030    Blood, Urine SMALL (A) Negative    pH, UA 5.5 5.0 - 8.0    Protein, UA Negative Negative mg/dL    Urobilinogen, Urine 0.2 <2.0 E.U./dL    Nitrite, Urine Negative Negative    Leukocyte Esterase, Urine Negative Negative    Microscopic Examination YES     Urine Reflex to Culture Not Indicated     Urine Type Not Specified    Pregnancy, Urine   Result Value Ref Range    HCG(Urine) Pregnancy Test Negative Detects HCG level >20 MIU/mL   Microscopic Urinalysis   Result Value Ref Range    WBC, UA 0-2 0 - 5 /HPF    RBC, UA 5-10 (A) 0 - 2 /HPF    Epi Cells 5-10 /HPF    Bacteria, UA 1+ (A) /HPF   EKG 12 Lead   Result Value Ref Range    Ventricular Rate 69 BPM    Atrial Rate 69 BPM    P-R Interval 146 ms    QRS Duration 78 ms    Q-T Interval 426 ms    QTc Calculation (Bazett) 456 ms    P Axis 52 degrees    R Axis 12 degrees    T Axis 50 degrees    Diagnosis       Normal sinus rhythmNormal ECGConfirmed by Charisse Richardson ([id number]) on 6/5/2019 9:19:35 AM       Treatment in the department:  Patient received the following while in the ED. Medications   ketorolac (TORADOL) injection 30 mg (30 mg Intravenous Given 6/5/19 0146)         Repeat exam  shows pain improved    Medical decision making:  Patient with RUQ/right lower chest pain radiating to back. Pain is pleuritic. No fever or URI symptoms. No pneumonia or pneumothorax on CXR. Not hypoxic. No risk factors for PE and has negative ddimer so unlikely to be PE. Not increased postprandially or accompanied by nausea or vomiting with normal WBC and no LFT elevation so cholecystitis seems unlikely but will give prescription for outpt gallbladder ultrasound. No UTI on UA. Could also be musculoskeletal in nature. No RLQ tenderness to suggest appendicitis.     I estimate there is LOW risk for ACUTE APPENDICITIS, BOWEL OBSTRUCTION, CHOLECYSTITIS, COMPLICATED DIVERTICULITIS, INCARCERATED HERNIA, PANCREATITIS,  PERFORATED BOWEL or ULCER, ISCHEMIC BOWEL, ABDOMINAL AORTIC ANEURYSM, AORTIC DISSECTION, PE, PNEUMONIA, thus I consider the discharge disposition reasonable. Also, there is no evidence or peritonitis, sepsis, or toxicity. Peter Merida and I have discussed the diagnosis and risks, and we agree with discharging home to follow-up with their primary doctor. We also discussed returning to the Emergency Department immediately if new or worsening symptoms occur. Clinical Impression:  1. Abdominal pain, right upper quadrant    2. Right-sided chest wall pain        Dispo:  Patient will be discharged ay findings with patient and they understand. Questions were answered to the best of my ability. Discharge vitals:  Blood pressure (!) 140/70, pulse 72, temperature 98.1 °F (36.7 °C), temperature source Oral, resp. rate 16, height 5' 2\" (1.575 m), weight 196 lb 3.2 oz (89 kg), last menstrual period 05/29/2019, SpO2 97 %. Prescriptions given:   Discharge Medication List as of 6/5/2019  3:07 AM      START taking these medications    Details   !! naproxen (NAPROSYN) 500 MG tablet Take 1 tablet by mouth 2 times daily for 10 days, Disp-20 tablet, R-0Print      methocarbamol (ROBAXIN-750) 750 MG tablet Take 1 tablet by mouth 3 times daily for 5 days, Disp-15 tablet, R-0Print       !! - Potential duplicate medications found. Please discuss with provider. This chart was created using Dragon voice recognition software.         Edilia Mancuso MD  06/07/19 0114

## 2019-10-01 ENCOUNTER — HOSPITAL ENCOUNTER (EMERGENCY)
Age: 49
Discharge: LEFT BEFORE DISCHARGE | End: 2019-10-01
Attending: EMERGENCY MEDICINE
Payer: COMMERCIAL

## 2019-10-01 VITALS
TEMPERATURE: 99 F | HEART RATE: 90 BPM | DIASTOLIC BLOOD PRESSURE: 87 MMHG | SYSTOLIC BLOOD PRESSURE: 153 MMHG | BODY MASS INDEX: 36.71 KG/M2 | HEIGHT: 62 IN | WEIGHT: 199.5 LBS | RESPIRATION RATE: 17 BRPM | OXYGEN SATURATION: 96 %

## 2019-10-01 DIAGNOSIS — Z87.898 HISTORY OF HEADACHE: Primary | ICD-10-CM

## 2019-10-01 PROCEDURE — 4500000002 HC ER NO CHARGE

## 2019-10-01 ASSESSMENT — PAIN DESCRIPTION - LOCATION: LOCATION: HEAD

## 2019-10-01 ASSESSMENT — PAIN DESCRIPTION - PAIN TYPE: TYPE: ACUTE PAIN

## 2019-10-01 ASSESSMENT — PAIN DESCRIPTION - DESCRIPTORS: DESCRIPTORS: HEADACHE;THROBBING

## 2019-10-01 ASSESSMENT — PAIN SCALES - GENERAL: PAINLEVEL_OUTOF10: 10

## 2019-12-30 ENCOUNTER — OFFICE VISIT (OUTPATIENT)
Dept: INTERNAL MEDICINE CLINIC | Age: 49
End: 2019-12-30
Payer: COMMERCIAL

## 2019-12-30 VITALS
DIASTOLIC BLOOD PRESSURE: 82 MMHG | HEART RATE: 108 BPM | WEIGHT: 192.2 LBS | OXYGEN SATURATION: 98 % | SYSTOLIC BLOOD PRESSURE: 138 MMHG | TEMPERATURE: 97.9 F | BODY MASS INDEX: 35.15 KG/M2

## 2019-12-30 DIAGNOSIS — Z00.00 ROUTINE GENERAL MEDICAL EXAMINATION AT A HEALTH CARE FACILITY: Primary | ICD-10-CM

## 2019-12-30 DIAGNOSIS — Z00.00 ROUTINE GENERAL MEDICAL EXAMINATION AT A HEALTH CARE FACILITY: ICD-10-CM

## 2019-12-30 DIAGNOSIS — H92.02 LEFT EAR PAIN: ICD-10-CM

## 2019-12-30 DIAGNOSIS — D36.7 DERMOID CYST OF NECK: ICD-10-CM

## 2019-12-30 LAB
A/G RATIO: 1.6 (ref 1.1–2.2)
ALBUMIN SERPL-MCNC: 4.6 G/DL (ref 3.4–5)
ALP BLD-CCNC: 61 U/L (ref 40–129)
ALT SERPL-CCNC: 7 U/L (ref 10–40)
ANION GAP SERPL CALCULATED.3IONS-SCNC: 15 MMOL/L (ref 3–16)
AST SERPL-CCNC: 16 U/L (ref 15–37)
BILIRUB SERPL-MCNC: <0.2 MG/DL (ref 0–1)
BUN BLDV-MCNC: 10 MG/DL (ref 7–20)
CALCIUM SERPL-MCNC: 9.5 MG/DL (ref 8.3–10.6)
CHLORIDE BLD-SCNC: 103 MMOL/L (ref 99–110)
CHOLESTEROL, FASTING: 198 MG/DL (ref 0–199)
CO2: 21 MMOL/L (ref 21–32)
CREAT SERPL-MCNC: 0.7 MG/DL (ref 0.6–1.1)
ESTIMATED AVERAGE GLUCOSE: 122.6 MG/DL
GFR AFRICAN AMERICAN: >60
GFR NON-AFRICAN AMERICAN: >60
GLOBULIN: 2.8 G/DL
GLUCOSE BLD-MCNC: 108 MG/DL (ref 70–99)
HBA1C MFR BLD: 5.9 %
HCT VFR BLD CALC: 38.2 % (ref 36–48)
HDLC SERPL-MCNC: 61 MG/DL (ref 40–60)
HEMOGLOBIN: 12.5 G/DL (ref 12–16)
LDL CHOLESTEROL CALCULATED: 120 MG/DL
MCH RBC QN AUTO: 30 PG (ref 26–34)
MCHC RBC AUTO-ENTMCNC: 32.8 G/DL (ref 31–36)
MCV RBC AUTO: 91.4 FL (ref 80–100)
PDW BLD-RTO: 14.5 % (ref 12.4–15.4)
PLATELET # BLD: 258 K/UL (ref 135–450)
PMV BLD AUTO: 8.2 FL (ref 5–10.5)
POTASSIUM SERPL-SCNC: 4.6 MMOL/L (ref 3.5–5.1)
RBC # BLD: 4.18 M/UL (ref 4–5.2)
SODIUM BLD-SCNC: 139 MMOL/L (ref 136–145)
TOTAL PROTEIN: 7.4 G/DL (ref 6.4–8.2)
TRIGLYCERIDE, FASTING: 84 MG/DL (ref 0–150)
TSH REFLEX: 2.12 UIU/ML (ref 0.27–4.2)
VITAMIN D 25-HYDROXY: 22.6 NG/ML
VLDLC SERPL CALC-MCNC: 17 MG/DL
WBC # BLD: 8.3 K/UL (ref 4–11)

## 2019-12-30 PROCEDURE — 3017F COLORECTAL CA SCREEN DOC REV: CPT | Performed by: NURSE PRACTITIONER

## 2019-12-30 PROCEDURE — 99213 OFFICE O/P EST LOW 20 MIN: CPT | Performed by: NURSE PRACTITIONER

## 2019-12-30 PROCEDURE — G8484 FLU IMMUNIZE NO ADMIN: HCPCS | Performed by: NURSE PRACTITIONER

## 2019-12-30 PROCEDURE — G8427 DOCREV CUR MEDS BY ELIG CLIN: HCPCS | Performed by: NURSE PRACTITIONER

## 2019-12-30 PROCEDURE — 1036F TOBACCO NON-USER: CPT | Performed by: NURSE PRACTITIONER

## 2019-12-30 PROCEDURE — G8417 CALC BMI ABV UP PARAM F/U: HCPCS | Performed by: NURSE PRACTITIONER

## 2019-12-30 RX ORDER — MELATONIN
2000 DAILY
Qty: 180 TABLET | Refills: 0 | Status: SHIPPED | OUTPATIENT
Start: 2019-12-30 | End: 2022-09-20

## 2019-12-30 ASSESSMENT — PATIENT HEALTH QUESTIONNAIRE - PHQ9
SUM OF ALL RESPONSES TO PHQ QUESTIONS 1-9: 0
SUM OF ALL RESPONSES TO PHQ QUESTIONS 1-9: 0
1. LITTLE INTEREST OR PLEASURE IN DOING THINGS: 0
SUM OF ALL RESPONSES TO PHQ9 QUESTIONS 1 & 2: 0
2. FEELING DOWN, DEPRESSED OR HOPELESS: 0

## 2019-12-30 ASSESSMENT — ENCOUNTER SYMPTOMS
COLOR CHANGE: 0
DIARRHEA: 0
SORE THROAT: 0
VOMITING: 0
NAUSEA: 0
ABDOMINAL PAIN: 0
BLOOD IN STOOL: 0
EYE REDNESS: 0
CONSTIPATION: 0
RHINORRHEA: 0
SHORTNESS OF BREATH: 0
EYE ITCHING: 0
COUGH: 0
CHEST TIGHTNESS: 0
WHEEZING: 0
BACK PAIN: 0
SINUS PRESSURE: 0

## 2020-02-20 RX ORDER — IBUPROFEN 800 MG/1
800 TABLET ORAL EVERY 8 HOURS PRN
Qty: 30 TABLET | Refills: 0 | Status: SHIPPED | OUTPATIENT
Start: 2020-02-20 | End: 2021-02-23 | Stop reason: SDUPTHER

## 2020-02-28 ENCOUNTER — OFFICE VISIT (OUTPATIENT)
Dept: INTERNAL MEDICINE CLINIC | Age: 50
End: 2020-02-28
Payer: COMMERCIAL

## 2020-02-28 ENCOUNTER — TELEPHONE (OUTPATIENT)
Dept: INTERNAL MEDICINE CLINIC | Age: 50
End: 2020-02-28

## 2020-02-28 VITALS
WEIGHT: 191 LBS | DIASTOLIC BLOOD PRESSURE: 80 MMHG | BODY MASS INDEX: 34.93 KG/M2 | SYSTOLIC BLOOD PRESSURE: 142 MMHG | OXYGEN SATURATION: 96 % | HEART RATE: 91 BPM

## 2020-02-28 PROBLEM — B35.4 TINEA CORPORIS: Status: ACTIVE | Noted: 2020-02-28

## 2020-02-28 PROCEDURE — G8417 CALC BMI ABV UP PARAM F/U: HCPCS | Performed by: NURSE PRACTITIONER

## 2020-02-28 PROCEDURE — 3017F COLORECTAL CA SCREEN DOC REV: CPT | Performed by: NURSE PRACTITIONER

## 2020-02-28 PROCEDURE — 1036F TOBACCO NON-USER: CPT | Performed by: NURSE PRACTITIONER

## 2020-02-28 PROCEDURE — G8484 FLU IMMUNIZE NO ADMIN: HCPCS | Performed by: NURSE PRACTITIONER

## 2020-02-28 PROCEDURE — G8427 DOCREV CUR MEDS BY ELIG CLIN: HCPCS | Performed by: NURSE PRACTITIONER

## 2020-02-28 PROCEDURE — 99213 OFFICE O/P EST LOW 20 MIN: CPT | Performed by: NURSE PRACTITIONER

## 2020-02-28 RX ORDER — NYSTATIN 100000 [USP'U]/G
POWDER TOPICAL
Qty: 45 G | Refills: 0 | Status: SHIPPED | OUTPATIENT
Start: 2020-02-28 | End: 2020-07-02 | Stop reason: SDUPTHER

## 2020-02-28 ASSESSMENT — ENCOUNTER SYMPTOMS
VOMITING: 0
SINUS PAIN: 0
ABDOMINAL PAIN: 0
BACK PAIN: 0
SORE THROAT: 0
NAUSEA: 0
PHOTOPHOBIA: 0
STRIDOR: 0
EYE DISCHARGE: 0
COUGH: 0
BLOOD IN STOOL: 0
WHEEZING: 0
DIARRHEA: 0
SHORTNESS OF BREATH: 0
EYE PAIN: 0
CONSTIPATION: 0
EYE REDNESS: 0

## 2020-02-28 ASSESSMENT — PATIENT HEALTH QUESTIONNAIRE - PHQ9
SUM OF ALL RESPONSES TO PHQ9 QUESTIONS 1 & 2: 0
1. LITTLE INTEREST OR PLEASURE IN DOING THINGS: 0
2. FEELING DOWN, DEPRESSED OR HOPELESS: 0
SUM OF ALL RESPONSES TO PHQ QUESTIONS 1-9: 0
SUM OF ALL RESPONSES TO PHQ QUESTIONS 1-9: 0

## 2020-02-28 NOTE — PROGRESS NOTES
Subjective:      Patient ID: Autumn Guardado is a 44 y.o. female. Chief Complaint   Patient presents with    Rash     under,breast and chest area / itching / x 3-4 days       Rash   This is a new problem. The current episode started yesterday. The affected locations include the chest. The rash is characterized by itchiness and redness. She was exposed to nothing. Pertinent negatives include no congestion, cough, diarrhea, eye pain, fever, shortness of breath, sore throat or vomiting. Past treatments include antibiotic cream and anti-itch cream. The treatment provided mild relief. Review of Systems   Constitutional: Negative for chills, diaphoresis and fever. HENT: Negative for congestion, ear discharge, ear pain, hearing loss, nosebleeds, sinus pain, sore throat and tinnitus. Eyes: Negative for photophobia, pain, discharge and redness. Respiratory: Negative for cough, shortness of breath, wheezing and stridor. Cardiovascular: Negative for chest pain, palpitations and leg swelling. Gastrointestinal: Negative for abdominal pain, blood in stool, constipation, diarrhea, nausea and vomiting. Endocrine: Negative for polydipsia. Genitourinary: Negative for dysuria, flank pain, frequency, hematuria and urgency. Musculoskeletal: Negative for back pain, myalgias and neck pain. Skin: Positive for rash. Allergic/Immunologic: Negative for environmental allergies. Neurological: Negative for dizziness, tremors, seizures, weakness and headaches. Hematological: Does not bruise/bleed easily. Psychiatric/Behavioral: Negative for hallucinations and suicidal ideas. The patient is not nervous/anxious. Objective:   Physical Exam  Constitutional:       Appearance: She is well-developed. HENT:      Head: Normocephalic.       Right Ear: External ear normal.      Left Ear: External ear normal.   Eyes:      Conjunctiva/sclera: Conjunctivae normal.      Pupils: Pupils are equal, round, and

## 2020-03-02 RX ORDER — NYSTATIN 100000 U/G
CREAM TOPICAL
Qty: 30 G | Refills: 1 | Status: SHIPPED | OUTPATIENT
Start: 2020-03-02 | End: 2020-07-02 | Stop reason: SDUPTHER

## 2020-07-02 ENCOUNTER — OFFICE VISIT (OUTPATIENT)
Dept: INTERNAL MEDICINE CLINIC | Age: 50
End: 2020-07-02
Payer: COMMERCIAL

## 2020-07-02 VITALS
DIASTOLIC BLOOD PRESSURE: 80 MMHG | HEIGHT: 62 IN | WEIGHT: 199.7 LBS | SYSTOLIC BLOOD PRESSURE: 130 MMHG | OXYGEN SATURATION: 98 % | TEMPERATURE: 99.1 F | BODY MASS INDEX: 36.75 KG/M2

## 2020-07-02 PROCEDURE — 1036F TOBACCO NON-USER: CPT | Performed by: NURSE PRACTITIONER

## 2020-07-02 PROCEDURE — 99213 OFFICE O/P EST LOW 20 MIN: CPT | Performed by: NURSE PRACTITIONER

## 2020-07-02 PROCEDURE — G8417 CALC BMI ABV UP PARAM F/U: HCPCS | Performed by: NURSE PRACTITIONER

## 2020-07-02 PROCEDURE — 3017F COLORECTAL CA SCREEN DOC REV: CPT | Performed by: NURSE PRACTITIONER

## 2020-07-02 PROCEDURE — G8427 DOCREV CUR MEDS BY ELIG CLIN: HCPCS | Performed by: NURSE PRACTITIONER

## 2020-07-02 RX ORDER — NYSTATIN 100000 U/G
CREAM TOPICAL
Qty: 30 G | Refills: 1 | Status: SHIPPED | OUTPATIENT
Start: 2020-07-02

## 2020-07-02 RX ORDER — HYDROQUINONE 40 MG/G
CREAM TOPICAL
Qty: 30 G | Refills: 2 | Status: SHIPPED | OUTPATIENT
Start: 2020-07-02

## 2020-07-02 RX ORDER — NYSTATIN 100000 [USP'U]/G
POWDER TOPICAL
Qty: 45 G | Refills: 0 | Status: SHIPPED | OUTPATIENT
Start: 2020-07-02 | End: 2022-09-20 | Stop reason: SDUPTHER

## 2020-07-02 ASSESSMENT — PATIENT HEALTH QUESTIONNAIRE - PHQ9
SUM OF ALL RESPONSES TO PHQ9 QUESTIONS 1 & 2: 0
SUM OF ALL RESPONSES TO PHQ QUESTIONS 1-9: 0
2. FEELING DOWN, DEPRESSED OR HOPELESS: 0
1. LITTLE INTEREST OR PLEASURE IN DOING THINGS: 0
SUM OF ALL RESPONSES TO PHQ QUESTIONS 1-9: 0
DEPRESSION UNABLE TO ASSESS: FUNCTIONAL CAPACITY MOTIVATION LIMITS ACCURACY

## 2020-07-02 ASSESSMENT — ENCOUNTER SYMPTOMS
BLOOD IN STOOL: 0
DIARRHEA: 0
RHINORRHEA: 0
COUGH: 0
SHORTNESS OF BREATH: 0
CONSTIPATION: 0
CHEST TIGHTNESS: 0
SINUS PRESSURE: 0
EYE REDNESS: 0
WHEEZING: 0
EYE ITCHING: 0
ABDOMINAL PAIN: 0
BACK PAIN: 0
COLOR CHANGE: 0
SORE THROAT: 0
VOMITING: 0
NAUSEA: 0

## 2020-07-02 NOTE — PROGRESS NOTES
Subjective:      Patient ID: Sanjuanita Mckeon is a 44 y.o. female. Chief Complaint   Patient presents with    Rash        HPI   Kin Steffen is in the office today to follow up on her dermatitis   She states that she has been using her creams without any issues. She is doing well   She would like to get orders for her mammogram and colonoscopy. No past medical history on file. Past Surgical History:   Procedure Laterality Date    CARPAL TUNNEL RELEASE Bilateral      No family history on file. Social History     Socioeconomic History    Marital status: Single     Spouse name: Not on file    Number of children: Not on file    Years of education: Not on file    Highest education level: Not on file   Occupational History    Not on file   Social Needs    Financial resource strain: Not on file    Food insecurity     Worry: Not on file     Inability: Not on file    Transportation needs     Medical: Not on file     Non-medical: Not on file   Tobacco Use    Smoking status: Never Smoker    Smokeless tobacco: Never Used   Substance and Sexual Activity    Alcohol use: No     Comment: occ    Drug use: No    Sexual activity: Never   Lifestyle    Physical activity     Days per week: Not on file     Minutes per session: Not on file    Stress: Not on file   Relationships    Social connections     Talks on phone: Not on file     Gets together: Not on file     Attends Sikhism service: Not on file     Active member of club or organization: Not on file     Attends meetings of clubs or organizations: Not on file     Relationship status: Not on file    Intimate partner violence     Fear of current or ex partner: Not on file     Emotionally abused: Not on file     Physically abused: Not on file     Forced sexual activity: Not on file   Other Topics Concern    Not on file   Social History Narrative    Not on file       Review of Systems   Constitutional: Negative for chills, fatigue and fever.    HENT: Negative for congestion, ear pain, postnasal drip, rhinorrhea, sinus pressure, sneezing and sore throat. Eyes: Negative for redness and itching. Respiratory: Negative for cough, chest tightness, shortness of breath and wheezing. Cardiovascular: Negative for chest pain and palpitations. Gastrointestinal: Negative for abdominal pain, blood in stool, constipation, diarrhea, nausea and vomiting. Endocrine: Negative for cold intolerance and heat intolerance. Genitourinary: Negative for difficulty urinating, dysuria, flank pain, frequency, hematuria and urgency. Musculoskeletal: Negative for arthralgias, back pain, joint swelling and myalgias. Skin: Positive for rash. Negative for color change, pallor and wound. Allergic/Immunologic: Negative for environmental allergies and food allergies. Neurological: Negative for dizziness, seizures, syncope, weakness, light-headedness, numbness and headaches. Hematological: Negative for adenopathy. Does not bruise/bleed easily. Psychiatric/Behavioral: Negative for confusion, sleep disturbance and suicidal ideas. The patient is not nervous/anxious and is not hyperactive. Objective:     Vitals:    07/02/20 1350   BP: 130/80   Site: Left Upper Arm   Position: Sitting   Cuff Size: Medium Adult   Temp: 99.1 °F (37.3 °C)   TempSrc: Oral   SpO2: 98%   Weight: 199 lb 11.2 oz (90.6 kg)   Height: 5' 2\" (1.575 m)     Physical Exam  HENT:      Head: Normocephalic and atraumatic. Right Ear: External ear normal.      Left Ear: External ear normal.      Nose: Nose normal.   Neck:      Musculoskeletal: Normal range of motion and neck supple. Vascular: No JVD. Cardiovascular:      Rate and Rhythm: Normal rate and regular rhythm. Heart sounds: Normal heart sounds. No murmur. No friction rub. No gallop. Pulmonary:      Effort: Pulmonary effort is normal. No respiratory distress. Breath sounds: Normal breath sounds. No wheezing.    Chest:      Chest wall: No tenderness. Abdominal:      General: Bowel sounds are normal. There is no distension. Palpations: Abdomen is soft. There is no mass. Tenderness: There is no abdominal tenderness. There is no guarding or rebound. Musculoskeletal: Normal range of motion. General: No tenderness or deformity. Skin:     General: Skin is warm and dry. Findings: Rash (bilateral hands. ) present. No erythema. Neurological:      Mental Status: She is alert and oriented to person, place, and time. Psychiatric:         Judgment: Judgment normal.       Current Outpatient Medications   Medication Sig Dispense Refill    nystatin (MYCOSTATIN) 877947 UNIT/GM powder Apply 3 times daily. 45 g 0    nystatin (MYCOSTATIN) 308657 UNIT/GM cream Apply topically 2 times daily. 30 g 1    hydroquinone 4 % cream Apply topically 2 times daily. 30 g 2    ibuprofen (ADVIL;MOTRIN) 800 MG tablet Take 1 tablet by mouth every 8 hours as needed for Pain 30 tablet 0    naproxen (NAPROSYN) 500 MG tablet Take 1 tablet by mouth 2 times daily (with meals) 30 tablet 0    terbinafine (ATHLETES FOOT) 1 % cream Apply topically 2 times daily. 15 g 0    Cholecalciferol (VITAMIN D3) 25 MCG (1000 UT) TABS Take 2 tablets by mouth daily 180 tablet 0    naproxen (NAPROSYN) 500 MG tablet Take 1 tablet by mouth 2 times daily for 10 days 20 tablet 0     No current facility-administered medications for this visit. PHQ Scores 7/2/2020 2/28/2020 12/30/2019 3/19/2019 2/4/2019 5/2/2018   PHQ2 Score 0 0 0 0 0 0   PHQ9 Score 0 0 0 0 0 0           :     1. Encounter for screening mammogram for malignant neoplasm of breast    2. Colon cancer screening    3. Post-inflammatory hyperpigmentation    4.  Tinea corporis      Plan:     Problem List     Post-inflammatory hyperpigmentation     Stable, controlled  No changes  Continue current treatment plan            Relevant Medications    hydroquinone 4 % cream    Tinea corporis     Stable, controlled  No changes  Continue current treatment plan            Relevant Medications    nystatin (MYCOSTATIN) 352836 UNIT/GM powder    nystatin (MYCOSTATIN) 380733 UNIT/GM cream             Discussed use, benefit, and side effects of all prescribed medications. Barriers to medication compliance addressed. All patient questions answered. Pt voiced understanding. All patientquestions answered. Pt voiced understanding.

## 2020-08-10 ENCOUNTER — TELEPHONE (OUTPATIENT)
Dept: INTERNAL MEDICINE CLINIC | Age: 50
End: 2020-08-10

## 2020-08-10 RX ORDER — METHYLPREDNISOLONE 4 MG/1
TABLET ORAL
Qty: 1 KIT | Refills: 0 | Status: SHIPPED | OUTPATIENT
Start: 2020-08-10 | End: 2020-08-16

## 2020-08-10 NOTE — TELEPHONE ENCOUNTER
Pt asking for prednisone  She has red, itchy bumps  They are on her chest but seem to be spreading everywhere

## 2020-11-06 ENCOUNTER — NURSE TRIAGE (OUTPATIENT)
Dept: OTHER | Facility: CLINIC | Age: 50
End: 2020-11-06

## 2020-11-17 ENCOUNTER — VIRTUAL VISIT (OUTPATIENT)
Dept: INTERNAL MEDICINE CLINIC | Age: 50
End: 2020-11-17
Payer: COMMERCIAL

## 2020-11-17 PROBLEM — M77.12 LATERAL EPICONDYLITIS OF LEFT ELBOW: Status: ACTIVE | Noted: 2020-11-17

## 2020-11-17 PROCEDURE — 3017F COLORECTAL CA SCREEN DOC REV: CPT | Performed by: NURSE PRACTITIONER

## 2020-11-17 PROCEDURE — G8427 DOCREV CUR MEDS BY ELIG CLIN: HCPCS | Performed by: NURSE PRACTITIONER

## 2020-11-17 PROCEDURE — 99213 OFFICE O/P EST LOW 20 MIN: CPT | Performed by: NURSE PRACTITIONER

## 2020-11-17 RX ORDER — NAPROXEN 250 MG/1
250 TABLET ORAL 2 TIMES DAILY PRN
Qty: 60 TABLET | Refills: 0 | Status: SHIPPED | OUTPATIENT
Start: 2020-11-17 | End: 2021-08-12 | Stop reason: ALTCHOICE

## 2020-11-17 ASSESSMENT — PATIENT HEALTH QUESTIONNAIRE - PHQ9
SUM OF ALL RESPONSES TO PHQ9 QUESTIONS 1 & 2: 0
2. FEELING DOWN, DEPRESSED OR HOPELESS: 0
SUM OF ALL RESPONSES TO PHQ QUESTIONS 1-9: 0
SUM OF ALL RESPONSES TO PHQ QUESTIONS 1-9: 0
1. LITTLE INTEREST OR PLEASURE IN DOING THINGS: 0
SUM OF ALL RESPONSES TO PHQ QUESTIONS 1-9: 0

## 2020-11-17 ASSESSMENT — ENCOUNTER SYMPTOMS
COUGH: 0
SINUS PRESSURE: 0
CHEST TIGHTNESS: 0
BLOOD IN STOOL: 0
WHEEZING: 0
COLOR CHANGE: 0
RHINORRHEA: 0
EYE ITCHING: 0
VOMITING: 0
EYE REDNESS: 0
SHORTNESS OF BREATH: 0
NAUSEA: 0
DIARRHEA: 0
CONSTIPATION: 0
SORE THROAT: 0
ABDOMINAL PAIN: 0
BACK PAIN: 0

## 2020-11-17 NOTE — PROGRESS NOTES
2020    TELEHEALTH EVALUATION -- Audio/Visual (During PZWAS-39 public health emergency)    HPI:    Ian Baez (:  1970) has requested an audio/video evaluation for the following concern(s):    No chief complaint on file. She has been having some left arm pain when she stretches her arm out she notices the pain. 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 She started to exercise and this has helped some. She states that when she pushes on the elbow on the lateral side there is some tenderness. She has been experiencing this for the past 3 weeks. She has not taken anything for her symptoms. She denies any injury     Review of Systems   Constitutional: Negative for chills, fatigue and fever. HENT: Negative for congestion, ear pain, postnasal drip, rhinorrhea, sinus pressure, sneezing and sore throat. Eyes: Negative for redness and itching. Respiratory: Negative for cough, chest tightness, shortness of breath and wheezing. Cardiovascular: Negative for chest pain and palpitations. Gastrointestinal: Negative for abdominal pain, blood in stool, constipation, diarrhea, nausea and vomiting. Endocrine: Negative for cold intolerance and heat intolerance. Genitourinary: Negative for difficulty urinating, dysuria, flank pain, frequency, hematuria and urgency. Musculoskeletal: Positive for arthralgias (left elbow). Negative for back pain, joint swelling and myalgias. Skin: Negative for color change, pallor, rash and wound. Allergic/Immunologic: Negative for environmental allergies and food allergies. Neurological: Negative for dizziness, seizures, syncope, weakness, light-headedness, numbness and headaches. Hematological: Negative for adenopathy. Does not bruise/bleed easily. Psychiatric/Behavioral: Negative for confusion, sleep disturbance and suicidal ideas. The patient is not nervous/anxious and is not hyperactive.         Prior to Visit Medications Effort: Pulmonary effort is normal.   Musculoskeletal: Normal range of motion. Left elbow: Tenderness found. Lateral epicondyle (when patient advised to palpate to lateral epicondyle) tenderness noted. Comments: Increased pain with full extension of left arm      Skin:     Coloration: Skin is not jaundiced or pale. Findings: No bruising, erythema, lesion or rash. Neurological:      Mental Status: She is alert and oriented to person, place, and time. Mental status is at baseline. Psychiatric:         Mood and Affect: Mood normal.         Behavior: Behavior normal.         Thought Content: Thought content normal.         Judgment: Judgment normal.         ASSESSMENT/PLAN:  Problem List     Lateral epicondylitis of left elbow     Educated on tendonopathy  Stretches sent via email to patient   Naproxen   Brace if needed  Advised on expected recovery time. Relevant Medications    naproxen (NAPROSYN) 500 MG tablet    ibuprofen (ADVIL;MOTRIN) 800 MG tablet    naproxen (NAPROSYN) 250 MG tablet            No follow-ups on file. Sal Humphrey is a 44 y.o. female being evaluated by a Virtual Visit (video visit) encounter to address concerns as mentioned above. A caregiver was present when appropriate. Due to this being a TeleHealth encounter (During QVJWV-17 public health emergency), evaluation of the following organ systems was limited: Vitals/Constitutional/EENT/Resp/CV/GI//MS/Neuro/Skin/Heme-Lymph-Imm. Pursuant to the emergency declaration under the [address] [place] [address], [address] [place] [address] authority and the [employer] Act, this Virtual Visit was conducted with patient's (and/or legal guardian's) consent, to reduce the patient's risk of exposure to COVID-19 and provide necessary medical care.   The patient (and/or legal guardian) has also been advised to contact this office for worsening conditions or problems, and seek emergency medical treatment and/or call 911 if deemed necessary. Patient identification was verified at the start of the visit: Yes    Total time spent on this encounter: Not billed by time    Services were provided through a video synchronous discussion virtually to substitute for in-person clinic visit. Patient and provider were located at their individual homes. --Va LunaidentSILVIA CNP on 11/17/2020 at 9:53 AM    An electronic signature was used to authenticate this note.

## 2020-11-17 NOTE — ASSESSMENT & PLAN NOTE
Educated on tendonopathy  Stretches sent via email to patient   Naproxen   Brace if needed  Advised on expected recovery time.

## 2021-02-22 ENCOUNTER — NURSE TRIAGE (OUTPATIENT)
Dept: OTHER | Facility: CLINIC | Age: 51
End: 2021-02-22

## 2021-02-22 NOTE — TELEPHONE ENCOUNTER
Reason for Disposition   MODERATE pain (e.g. interferes with normal activities) and present > 3 days    Answer Assessment - Initial Assessment Questions  1. ONSET: \"When did the pain start? \"      States she doesn't remember when it started states she was seen for the pain and it is still hurting    2. LOCATION: \"Where is the pain located? \"      Left elbow    3. PAIN: \"How bad is the pain? \" (Scale 1-10; or mild, moderate, severe)    - MILD (1-3): doesn't interfere with normal activities    - MODERATE (4-7): interferes with normal activities (e.g., work or school) or awakens from sleep    - SEVERE (8-10): excruciating pain, unable to do any normal activities, unable to hold a cup of water      4/10 states pain comes and goes states when she uses at work it is worse    4. WORK OR EXERCISE: \"Has there been any recent work or exercise that involved this part of the body? 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 CAUSE: \"What do you think is causing the arm pain? \"      Unknown    6. OTHER SYMPTOMS: \"Do you have any other symptoms? \" (e.g., neck pain, swelling, rash, fever, numbness, weakness)      Denies    7. PREGNANCY: \"Is there any chance you are pregnant? \" \"When was your last menstrual period? \"    Protocols used: ARM PAIN-ADULT-OH    [facility]  with red flag complaint. Brief description of triage: see above    Triage indicates for patient to be seen in the next 3 days    Care advice provided, patient verbalizes understanding; denies any other questions or concerns; instructed to call back for any new or worsening symptoms. Writer provided warm transfer to kg bell at [facility] for appointment scheduling. Attention Provider: Thank you for allowing me to participate in the care of your patient. The patient was connected to triage in response to information provided to the [facility].   Please do not respond through this encounter as the response is not directed to a shared pool.

## 2021-02-23 ENCOUNTER — OFFICE VISIT (OUTPATIENT)
Dept: INTERNAL MEDICINE CLINIC | Age: 51
End: 2021-02-23
Payer: COMMERCIAL

## 2021-02-23 VITALS
OXYGEN SATURATION: 98 % | HEIGHT: 62 IN | HEART RATE: 70 BPM | TEMPERATURE: 97.7 F | WEIGHT: 212 LBS | SYSTOLIC BLOOD PRESSURE: 120 MMHG | BODY MASS INDEX: 39.01 KG/M2 | DIASTOLIC BLOOD PRESSURE: 70 MMHG

## 2021-02-23 DIAGNOSIS — L73.2 HIDRADENITIS SUPPURATIVA: ICD-10-CM

## 2021-02-23 DIAGNOSIS — M77.12 LATERAL EPICONDYLITIS OF LEFT ELBOW: Primary | ICD-10-CM

## 2021-02-23 DIAGNOSIS — Z12.11 COLON CANCER SCREENING: ICD-10-CM

## 2021-02-23 PROBLEM — H92.02 LEFT EAR PAIN: Status: RESOLVED | Noted: 2019-12-30 | Resolved: 2021-02-23

## 2021-02-23 PROBLEM — L81.9 DISCOLORATION OF SKIN OF MULTIPLE SITES: Status: RESOLVED | Noted: 2019-02-04 | Resolved: 2021-02-23

## 2021-02-23 PROBLEM — N39.3 STRESS INCONTINENCE IN FEMALE: Status: RESOLVED | Noted: 2019-02-04 | Resolved: 2021-02-23

## 2021-02-23 PROBLEM — B35.4 TINEA CORPORIS: Status: RESOLVED | Noted: 2020-02-28 | Resolved: 2021-02-23

## 2021-02-23 PROBLEM — N63.10 BREAST MASS, RIGHT: Status: RESOLVED | Noted: 2019-02-04 | Resolved: 2021-02-23

## 2021-02-23 PROBLEM — L81.0 POST-INFLAMMATORY HYPERPIGMENTATION: Status: RESOLVED | Noted: 2019-02-12 | Resolved: 2021-02-23

## 2021-02-23 PROCEDURE — 99214 OFFICE O/P EST MOD 30 MIN: CPT | Performed by: NURSE PRACTITIONER

## 2021-02-23 PROCEDURE — 1036F TOBACCO NON-USER: CPT | Performed by: NURSE PRACTITIONER

## 2021-02-23 PROCEDURE — G8427 DOCREV CUR MEDS BY ELIG CLIN: HCPCS | Performed by: NURSE PRACTITIONER

## 2021-02-23 PROCEDURE — 3017F COLORECTAL CA SCREEN DOC REV: CPT | Performed by: NURSE PRACTITIONER

## 2021-02-23 PROCEDURE — G8417 CALC BMI ABV UP PARAM F/U: HCPCS | Performed by: NURSE PRACTITIONER

## 2021-02-23 PROCEDURE — G8484 FLU IMMUNIZE NO ADMIN: HCPCS | Performed by: NURSE PRACTITIONER

## 2021-02-23 RX ORDER — DOXYCYCLINE HYCLATE 100 MG
100 TABLET ORAL 2 TIMES DAILY
Qty: 14 TABLET | Refills: 0 | Status: SHIPPED | OUTPATIENT
Start: 2021-02-23 | End: 2021-03-02

## 2021-02-23 RX ORDER — IBUPROFEN 800 MG/1
800 TABLET ORAL EVERY 8 HOURS PRN
Qty: 30 TABLET | Refills: 0 | Status: SHIPPED | OUTPATIENT
Start: 2021-02-23

## 2021-02-23 RX ORDER — MUPIROCIN CALCIUM 20 MG/G
CREAM TOPICAL
Qty: 30 G | Refills: 0 | Status: SHIPPED | OUTPATIENT
Start: 2021-02-23 | End: 2021-03-25

## 2021-02-23 ASSESSMENT — ENCOUNTER SYMPTOMS
RHINORRHEA: 0
COLOR CHANGE: 0
SORE THROAT: 0
NAUSEA: 0
BACK PAIN: 0
BLOOD IN STOOL: 0
ABDOMINAL PAIN: 0
SINUS PRESSURE: 0
COUGH: 0
EYE REDNESS: 0
SHORTNESS OF BREATH: 0
CHEST TIGHTNESS: 0
EYE ITCHING: 0
WHEEZING: 0
DIARRHEA: 0
CONSTIPATION: 0
VOMITING: 0

## 2021-02-23 ASSESSMENT — PATIENT HEALTH QUESTIONNAIRE - PHQ9
SUM OF ALL RESPONSES TO PHQ QUESTIONS 1-9: 0
SUM OF ALL RESPONSES TO PHQ9 QUESTIONS 1 & 2: 0
1. LITTLE INTEREST OR PLEASURE IN DOING THINGS: 0
2. FEELING DOWN, DEPRESSED OR HOPELESS: 0

## 2021-02-23 NOTE — ASSESSMENT & PLAN NOTE
At this time we will proceed with physical therapy as she has some improvement but it has been slow. Continue anti-inflammatories at this time for treatment.

## 2021-02-23 NOTE — PROGRESS NOTES
Allergic/Immunologic: Negative for environmental allergies and food allergies. Neurological: Negative for dizziness, seizures, syncope, weakness, light-headedness, numbness and headaches. Hematological: Negative for adenopathy. Does not bruise/bleed easily. Psychiatric/Behavioral: Negative for confusion, sleep disturbance and suicidal ideas. The patient is not nervous/anxious and is not hyperactive. Vitals:    02/23/21 1221   BP: 120/70   Site: Left Upper Arm   Position: Sitting   Cuff Size: Large Adult   Pulse: 70   Temp: 97.7 °F (36.5 °C)   TempSrc: Temporal   SpO2: 98%   Weight: 212 lb (96.2 kg)   Height: 5' 2\" (1.575 m)       Physical Exam  Constitutional:       Appearance: Normal appearance. She is well-developed. HENT:      Head: Normocephalic and atraumatic. Right Ear: Hearing normal.      Left Ear: Hearing normal.      Nose: No mucosal edema. Right Sinus: No maxillary sinus tenderness or frontal sinus tenderness. Left Sinus: No maxillary sinus tenderness or frontal sinus tenderness. Mouth/Throat: Tonsils: No tonsillar abscesses. Eyes:      Extraocular Movements: Extraocular movements intact. Pupils: Pupils are equal, round, and reactive to light. Cardiovascular:      Rate and Rhythm: Normal rate and regular rhythm. Pulses: Normal pulses. Heart sounds: Normal heart sounds. Pulmonary:      Effort: Pulmonary effort is normal.      Breath sounds: Normal breath sounds. Abdominal:       Musculoskeletal:      Left elbow: She exhibits normal range of motion and no swelling. Tenderness found. Lateral epicondyle tenderness noted. Lymphadenopathy:      Head:      Right side of head: No submental, submandibular, tonsillar, preauricular, posterior auricular or occipital adenopathy. Left side of head: No submental, submandibular, tonsillar, preauricular, posterior auricular or occipital adenopathy. Skin:     General: Skin is warm and dry.

## 2021-02-26 LAB
GRAM STAIN RESULT: ABNORMAL
ORGANISM: ABNORMAL
WOUND/ABSCESS: ABNORMAL

## 2021-03-02 ENCOUNTER — HOSPITAL ENCOUNTER (OUTPATIENT)
Dept: PHYSICAL THERAPY | Age: 51
Setting detail: THERAPIES SERIES
Discharge: HOME | End: 2021-03-02
Payer: COMMERCIAL

## 2021-03-02 NOTE — CARE COORDINATION
Physical Therapy  Cancellation/No-show Note  Patient Name:  Blanca Turpin  :  1970   Date:  3/2/2021  MRN: [id number]  Cancelled visits to date: 1  Eval 2021  No-shows to date: 0    For today's appointment patient:  [x]  Cancelled  []  Rescheduled appointment  []  No-show     Reason given by patient:  []  Patient ill  []  Conflicting appointment  []  No transportation    []  Conflict with work  []  No reason given  [x]  Other:     Comments:  Something came up    Electronically signed by:   Esther Tolentino, DPT [id number]

## 2021-03-05 ENCOUNTER — HOSPITAL ENCOUNTER (OUTPATIENT)
Dept: PHYSICAL THERAPY | Age: 51
Setting detail: THERAPIES SERIES
Discharge: HOME | End: 2021-03-05
Payer: COMMERCIAL

## 2021-03-05 PROCEDURE — 97110 THERAPEUTIC EXERCISES: CPT

## 2021-03-05 PROCEDURE — 97530 THERAPEUTIC ACTIVITIES: CPT

## 2021-03-05 PROCEDURE — 97161 PT EVAL LOW COMPLEX 20 MIN: CPT

## 2021-03-05 NOTE — PLAN OF CARE
Outpatient Physical Therapy  Phone: [phone] Fax: [phone]    To: Referring Practitioner: SILVIA Cerrato CNP  From: Sonal Ortega PT, DPT [id number]   Date: 3/5/2021  Patient: Rachael Santamaria     : 1970 MRN: [id number]  Diagnosis: Diagnosis: Lateral epicondylitis of left elbow (M77.12)   Treatment Diagnosis: Treatment Diagnosis: L elbow pain     Physical Therapy Certification/Re-Certification Form  Dear SILVIA Cerrato CNP,   The following patient has been evaluated for physical therapy services and for therapy to continue, Medicare requires monthly physician review of the treatment plan. Please review the attached evaluation and/or summary of the patient's plan of care, and verify that you agree therapy should continue by signing the attached document and sending it back to our office.     Plan of Care/Treatment to date:  [x] Therapeutic Exercise (Review/Progress HEP and provide verbal/tactile cueing for activities related to strengthening, flexibility,  endurance, ROM.)       [x] Therapeutic Activity (Provide verbal/tactile cueing for dynamic activities to promote functional tasks.)          [] Gait Training (Provide verbal/tactile/visual cueing for facilitation of normalized gait pattern without or with the least restrictive AD to decrease pain and/or risk for falling.)          [] Neuromuscular Re-education (Review/Progress HEP and provide verbal/tactile cueing for activities related to improving balance, coordination, kinesthetic sense, posture, motor skill, proprioception.)         [x] Manual Therapy (Provide manual therapy to mobilize soft tissue/joints for the purpose of modulating pain, promoting relaxation, increasing ROM, reducing/eliminating soft tissue swelling/inflammation/tightness, improving soft tissue extensibility)   [x] Dry Needling    [] Aquatic Therapy (Facilitate muscle relaxation and increases peripheral circulation; stimulates body awareness, balance, and trunk stability.)                  [x] Modalities (For modulating pain/tenderness/paresthesias, reducing swelling/inflammation/tightness, improving soft tissue extensibility, and/or to increase muscle tone/strength):     [] Ultrasound  [] Electrical Stimulation        [] Cervical Traction [] Lumbar Traction       [] Cold/hotpack [] Iontophoresis   Other:      []          []      Assessment:  Conditions Requiring Skilled Therapeutic Intervention  Body structures, Functions, Activity limitations: Decreased functional mobility , Decreased strength, Increased pain  Assessment: Pt presents with pain, decreased strength limiting N reaching, carrying, lifting  Treatment Diagnosis: L elbow pain  Prognosis: Good  Decision Making: Low Complexity  REQUIRES PT FOLLOW UP: Yes    Goals:  Short term goals  Time Frame for Short term goals: 2 weeks  Short term goal 1: Pt will demo good understanding and knowledge of initial HEP  Short term goal 2: Pt with only mild tenderness L lateral epicondyle  Long term goals  Time Frame for Long term goals : 4 weeks  Long term goal 1: Pt will demo good understanding and knowledge of HEP progressions  Long term goal 2: Decreased pain 1/10 at worst to allow for N functional mobility as previous  Long term goal 3: AROM L shoulder IR reach WFLs and wrist ext WFLs with elbow extended each without pain to allow for N reaching tasks  Long term goal 4: Improve strength with L shoulder flex/abd and L wrist ext 5/5 to allow for N carrying/lifting without increased pain  Long term goal 5: Decreased impairment per UEFI <10% impaired    Frequency/Duration:  # Days per week: [] 1 day # Weeks: [] 1 week [] 5 weeks     [x] 2 days   [] 2 weeks [] 6 weeks     [] 3 days   [] 3 weeks [] 7 weeks     [] 4 days   [x] 4 weeks [] 8 weeks    Rehab Potential: [] Excellent [x] Good [] Fair  [] Poor       Electronically signed by:   Raysa Bravo, PT, DPT [id number]        If you have any questions or concerns, please don't hesitate to call.   Thank you for your referral.      Physician Signature:________________________________Date:__________________  By signing above, therapists plan is approved by physician

## 2021-03-05 NOTE — FLOWSHEET NOTE
Physical Therapy Daily Treatment Note  Date:  3/5/2021    Patient Name:  Jatinder Kinsey    :  1970  MRN: [id number]  Restrictions/Precautions:   NA   Medical/Treatment Diagnosis Information:  · Diagnosis: Lateral epicondylitis of left elbow (M77.12)  · Treatment Diagnosis: L elbow pain  Insurance/Certification information:  [facility]  [facility]  Physician Information:  Referring Practitioner: SILVIA Weiner CNP, MD Follow-up Visit: ?  Plan of care signed (Y/N): Sent to CNP via [facility]   Visit# / total visits:    Pain level: 0-5/10     Progress Note Due (10 visits or 30 days, whichever is less):    Recertification Note Due (End of POC or 90 days, whichever is less):      Subjective:  2021: Pt reports began to have L elbow pain Oct/2020 without known cause. Pt states that it is improving but pain is still present. Pt notes that her pain ranges from 0/10 at best to 5/10 at worst.  Pain is worse with reaching across body or behind her back, if bumps elbow and with carrying/lifting. Pt did take Naproxen as prescribed and is now finished with prescription - seemed to help decrease pain as now is not as severe as it used to be. Pt reports pain is described as achy pain, and used to be sharp. Denies N/T. Pt is R hand dominant but does do some things with L hand. PLOF: No pain or limitations.           Objective:2021   Observation:    Palpation: moderate tenderness along L lateral epicondyle, mild tenderness/tightness along wrist extensor muscle belly proximally     Test measurements:     AROM RUE (degrees)  RUE AROM : WFL  AROM LUE (degrees)  LUE AROM : WFL  LUE General AROM: with the exception of IR reach pt \"loosened arm up first\" and was effortful but = R.  Elbow WFLs, wrist WFLs with mild pain at endrange ext with elbow extended     Strength RLE  Comment: shoulder/elbow/wrist 5/5, R  on PAULETTE Lv 2: 70#, 65#, 65#  Strength LLE  Comment: shoulder flex/abd 4/5 each with pain, IR/ER 5/5 without pain, elbow 5/5, wrist ext 4+/5, flex 5/5. L  on PAULETTE Lv 2: 70#, 75#, 65#  Additional Measures  Special Tests: (-) Cozen and (-) Mill's test L  Other: UEFI = 30% impaired    Exercises, Neuro Facilitation & Gait Training (07535, [id number], [id number]): Activity Resistance/Repetitions Other comments   Wrist flex/ext stretch with elbow extended 15\" x 3 each L         Wrist ext strengthening With eccentric focus                                                             Therapeutic Activities (14105): Educated pt in anatomy of involved region. Pt encouraged to use ice or ice massage to L lateral epicondyle/wrist ext muscle belly for decreasing pain. Pt verbalized good understanding. Home Exercise Program:   Pt. demonstrated good understanding and knowledge of HEP. Written instructions provided. 03/05/2021: wrist flex/ext stretches. Access Code: [email or web address] Patient Portal: [employer]/    Manual Treatments (83479):   Add cross friction to L wrist ext muscle belly prn    Modalities: add US L lateral epicondyle prn     Timed Code Treatment Minutes:  TE: 15, TA: 8    Total Treatment Minutes:  37 (+Eval low)    Treatment/Activity Tolerance:  [x] Patient tolerated treatment well [] Patient limited by fatigue  [] Patient limited by pain  [] Patient limited by other medical complications  [] Other:     Assessment: Pt presents with pain, decreased strength limiting N reaching, carrying, lifting    Prognosis: [x] Good [] Fair  [] Poor    Patient Requires Follow-up: [x] Yes  [] No    Goals:  Short term goals  Time Frame for Short term goals: 2 weeks  Short term goal 1: Pt will demo good understanding and knowledge of initial HEP  Short term goal 2: Pt with only mild tenderness L lateral epicondyle  Long term goals  Time Frame for Long term goals : 4 weeks  Long term goal 1: Pt will demo good understanding and knowledge of HEP progressions  Long term goal 2: Decreased pain 1/10 at worst to allow for N functional mobility as previous  Long term goal 3: AROM L shoulder IR reach WFLs and wrist ext WFLs with elbow extended each without pain to allow for N reaching tasks  Long term goal 4: Improve strength with L shoulder flex/abd and L wrist ext 5/5 to allow for N carrying/lifting without increased pain  Long term goal 5: Decreased impairment per UEFI <10% impaired    Plan:   Visits per week:  2  # of weeks:  4    [] Continue per plan of care [] Alter current plan (see comments)  [x] Plan of care initiated [] Hold pending MD visit [] Discharge    Plan for Next Session: review HEP, add exercises/manual as tolerated     Electronically signed by:   Daryl Harrell DPT [id number]

## 2021-03-05 NOTE — PROGRESS NOTES
Physical Therapy  Initial Assessment  Date: 3/5/2021  Patient Name: Alexandro Billingsley  MRN: [id number]  : 1970     Treatment Diagnosis: L elbow pain    Restrictions: NA       Subjective   General  Chart Reviewed: Yes  Patient assessed for rehabilitation services?: Yes  Additional Pertinent Hx: NA  Referring Practitioner: SILVIA Parmar CNP  Referral Date : 21  Diagnosis: Lateral epicondylitis of left elbow (M77.12)  General Comment  Comments: no imaging  PT Visit Information  Onset Date: (Oct/2020)  [facility] per karly year  Total # of Visits Approved: 8  Total # of Visits to Date: 1  Subjective  Subjective: Pt reports began to have L elbow pain Oct/2020 without known cause. Pt states that it is improving but pain is still present. Pt notes that her pain ranges from 0/10 at best to 5/10 at worst.  Pain is worse with reaching across body or behind her back, if bumps elbow and with carrying/lifting. Pt did take Naproxen as prescribed and is now finished with prescription - seemed to help decrease pain as now is not as severe as it used to be. Pt reports pain is described as achy pain, and used to be sharp. Denies N/T. Pt is R hand dominant but does do some things with L hand. PLOF: No pain or limitations.        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  Elbow WFLs, wrist WFLs with mild pain at endrange ext with elbow extended    Strength RLE  Comment: shoulder/elbow/wrist 5/5, R  on PAULETTE Lv 2: 70#, [place], [place], [address]

## 2021-03-05 NOTE — PROGRESS NOTES
The Upper Extremity Functional Index    Patient: Deya Dempsey  : 1970  MRN: [id number]  Date: 3/5/2021  Electronically Signed by: Paula Strong PT, DPT [id number]     We are interested in knowing whether you are having any difficulty at all with the activities listed below because of your upper limb problem for which you are currently seeking attention. Please provide an answer for each activity.          Today do you or would you have difficulty at all with:    Activities Extreme Difficulty or Unable to Perform Activity Quite a Bit of Difficulty Moderate Difficulty A Little Bit of Difficulty No Difficulty   1 Any of your usual work, housework, or school activities []0 []1 []2  [x]3 []4   2 Your usual hobbies, recreational, or sporting activities []0 []1 []2 [x]3 []4   3 Lifting a bag of groceries to waist level []0 []1 []2 [x]3 []4   4 Lifting a bag of groceries above your head []0 []1 [x]2 []3 []4   5 Grooming your hair []0 []1 [x]2 []3 []4   6 Pushing up on your hands (eg from bathtub/chair) []0 []1 [x]2 []3 []4   7 Preparing food (eg peeling, cutting) []0 []1 []2 [x]3 []4   8 Driving []0 []1 []2 []3 [x]4   9 Vacuuming, sweeping, or raking []0 []1 []2 [x]3 []4   10 Dressing []0 []1 []2 [x]3 []4   11 Doing up buttons []0 []1 []2 [x]3 []4   12 Using tools or appliances []0 []1 []2 [x]3 []4   13 Opening doors []0 []1 []2 [x]3 []4   14 Cleaning []0 []1 []2 [x]3 []4   15 Tying or lacing shoes []0 []1 []2 [x]3 []4   16 Sleeping []0 []1 []2 []3 [x]4   17 Laundering clothes (eg washing, ironing, folding)  []0 []1 []2 [x]3 []4   18 Opening a jar []0 []1 [x]2 []3 []4   19 Throwing a ball []0 []1 [x]2 []3 []4   20 Carrying a small suitcase with your affected limb []0 []1 [x]2 []3 []4    Column Totals:          Patient Score from Above: 56/80    (Patient Score / 80) X 100:  70%      Scoring Method for UEFI    Scoring:   UEFI Score = (Sum of Responses / 80) X 100    Error + / - 5 points, Minimum Level of Detectable Change (90% Confidence): 9 Points     Braulio et al. (2001): Development and initial validation of the upper extremity functional index.   Physiotherapy [facility]) 53 (4): 318-56      G-Code [place]:  UEFI Total Score Disability Index CMS Modifier   80 0% []CH   79-64 1-19% []CI   63-48 20-39% []CJ   47-32 40-59% []CK   31-16 60-79% []CL   15-1 80-99% []CM   0 100% []CN

## 2021-03-09 ENCOUNTER — HOSPITAL ENCOUNTER (OUTPATIENT)
Dept: PHYSICAL THERAPY | Age: 51
Setting detail: THERAPIES SERIES
Discharge: HOME | End: 2021-03-09
Payer: COMMERCIAL

## 2021-03-09 ENCOUNTER — OFFICE VISIT (OUTPATIENT)
Dept: INTERNAL MEDICINE CLINIC | Age: 51
End: 2021-03-09
Payer: COMMERCIAL

## 2021-03-09 VITALS
TEMPERATURE: 97.1 F | HEART RATE: 78 BPM | WEIGHT: 214 LBS | DIASTOLIC BLOOD PRESSURE: 89 MMHG | BODY MASS INDEX: 39.38 KG/M2 | HEIGHT: 62 IN | SYSTOLIC BLOOD PRESSURE: 139 MMHG | OXYGEN SATURATION: 98 %

## 2021-03-09 DIAGNOSIS — L73.2 HIDRADENITIS SUPPURATIVA: ICD-10-CM

## 2021-03-09 DIAGNOSIS — M77.12 LATERAL EPICONDYLITIS OF LEFT ELBOW: ICD-10-CM

## 2021-03-09 DIAGNOSIS — R03.0 ELEVATED BP WITHOUT DIAGNOSIS OF HYPERTENSION: ICD-10-CM

## 2021-03-09 DIAGNOSIS — R73.03 PRE-DIABETES: Primary | ICD-10-CM

## 2021-03-09 LAB — HBA1C MFR BLD: 6.2 %

## 2021-03-09 PROCEDURE — G8417 CALC BMI ABV UP PARAM F/U: HCPCS | Performed by: NURSE PRACTITIONER

## 2021-03-09 PROCEDURE — G8484 FLU IMMUNIZE NO ADMIN: HCPCS | Performed by: NURSE PRACTITIONER

## 2021-03-09 PROCEDURE — G8427 DOCREV CUR MEDS BY ELIG CLIN: HCPCS | Performed by: NURSE PRACTITIONER

## 2021-03-09 PROCEDURE — 1036F TOBACCO NON-USER: CPT | Performed by: NURSE PRACTITIONER

## 2021-03-09 PROCEDURE — 99214 OFFICE O/P EST MOD 30 MIN: CPT | Performed by: NURSE PRACTITIONER

## 2021-03-09 PROCEDURE — 3017F COLORECTAL CA SCREEN DOC REV: CPT | Performed by: NURSE PRACTITIONER

## 2021-03-09 PROCEDURE — 97035 APP MDLTY 1+ULTRASOUND EA 15: CPT

## 2021-03-09 PROCEDURE — 83036 HEMOGLOBIN GLYCOSYLATED A1C: CPT | Performed by: NURSE PRACTITIONER

## 2021-03-09 PROCEDURE — 97140 MANUAL THERAPY 1/> REGIONS: CPT

## 2021-03-09 PROCEDURE — 97110 THERAPEUTIC EXERCISES: CPT

## 2021-03-09 RX ORDER — CLINDAMYCIN PHOSPHATE 10 MG/G
GEL TOPICAL
Qty: 60 G | Refills: 5 | Status: SHIPPED | OUTPATIENT
Start: 2021-03-09 | End: 2021-08-02 | Stop reason: SDUPTHER

## 2021-03-09 ASSESSMENT — ENCOUNTER SYMPTOMS
COUGH: 0
EYE REDNESS: 0
WHEEZING: 0
SORE THROAT: 0
SHORTNESS OF BREATH: 0
COLOR CHANGE: 0
RHINORRHEA: 0
DIARRHEA: 0
BLOOD IN STOOL: 0
BACK PAIN: 0
SINUS PRESSURE: 0
EYE ITCHING: 0
VOMITING: 0
CONSTIPATION: 0
ABDOMINAL PAIN: 0
NAUSEA: 0
CHEST TIGHTNESS: 0

## 2021-03-09 ASSESSMENT — PATIENT HEALTH QUESTIONNAIRE - PHQ9
SUM OF ALL RESPONSES TO PHQ QUESTIONS 1-9: 0
SUM OF ALL RESPONSES TO PHQ QUESTIONS 1-9: 0
DEPRESSION UNABLE TO ASSESS: FUNCTIONAL CAPACITY MOTIVATION LIMITS ACCURACY
SUM OF ALL RESPONSES TO PHQ9 QUESTIONS 1 & 2: 0

## 2021-03-09 NOTE — FLOWSHEET NOTE
Physical Therapy Daily Treatment Note  Date:  3/9/2021    Patient Name:  Olga Lidia Maharaj    :  1970  MRN: [id number]  Restrictions/Precautions:   NA   Medical/Treatment Diagnosis Information:  · Diagnosis: Lateral epicondylitis of left elbow (M77.12)  · Treatment Diagnosis: L elbow pain  Insurance/Certification information:  PT Insurance Information: [facility], 30 visits per  year  Physician Information:  Referring Practitioner: SILVIA Chester CNP, MD Follow-up Visit: ?  Plan of care signed (Y/N):  Y   Visit# / total visits:  2/8  Pain level: 0/10 at rest     Progress Note Due (10 visits or 30 days, whichever is less):    Recertification Note Due (End of POC or 90 days, whichever is less):      Subjective:  2021: Pt reports began to have L elbow pain Oct/2020 without known cause. Pt states that it is improving but pain is still present. Pt notes that her pain ranges from 0/10 at best to 5/10 at worst.  Pain is worse with reaching across body or behind her back, if bumps elbow and with carrying/lifting. Pt did take Naproxen as prescribed and is now finished with prescription - seemed to help decrease pain as now is not as severe as it used to be. Pt reports pain is described as achy pain, and used to be sharp. Denies N/T. Pt is R hand dominant but does do some things with L hand. PLOF: No pain or limitations. 2021: Pt reports no complaints after  and Saturday felt okay. Pt reports cooked all day  and was sore all over, more pain in L elbow yesterday and last night used ice which helped and is better today. Is sore with movement of elbow especially full elbow extension today.               Objective:2021   Observation:    Palpation: moderate tenderness along L lateral epicondyle, mild tenderness/tightness along wrist extensor muscle belly proximally     Test measurements:     AROM RUE (degrees)  RUE AROM : WFL  AROM LUE (degrees)  LUE AROM : WFL  LUE General AROM: with the exception of IR reach pt \"loosened arm up first\" and was effortful but = R.  Elbow WFLs, wrist WFLs with mild pain at endrange ext with elbow extended     Strength RLE  Comment: shoulder/elbow/wrist 5/5, R  on PAULETTE Lv 2: 70#, 65#, 65#  Strength LLE  Comment: shoulder flex/abd 4/5 each with pain, IR/ER 5/5 without pain, elbow 5/5, wrist ext 4+/5, flex 5/5. L  on PAULETTE Lv 2: 70#, 75#, 65#  Additional Measures  Special Tests: (-) Cozen and (-) Mill's test L  Other: UEFI = 30% impaired    Exercises, Neuro Facilitation & Gait Training (17668, [phone], [id number]): Activity Resistance/Repetitions Other comments   Wrist flex/ext stretch with elbow extended 15\" x 3 each L Bend elbow slightly with wrist flexion stretch since was irritated this past weekend to avoid increased pain                                                                    Therapeutic Activities (): Educated pt in option of lateral epicondylitis strap for use when arm is active and to not wear when at rest/sleeping. Pt verbalized good understanding. Home Exercise Program:   Pt. demonstrated good understanding and knowledge of HEP. Written instructions provided. 03/05/2021: wrist flex/ext stretches. Access Code: [email or web address] Patient Portal: [email or web address]    Manual Treatments (37136):  Gentle cross friction to L wrist ext muscle belly with pt resting L UE on pillow in sitting, good tolerance reported    Modalities: US L lateral epicondyle 8 mins, 3 MHz, 1.0 w/cm2, 20% with pt resting L UE on pillow in sitting, good tolerance reported    Timed Code Treatment Minutes:  TE: 15, US: 8, MT: 12, TA: 5    Total Treatment Minutes:  40     Treatment/Activity Tolerance:  [x] Patient tolerated treatment well [] Patient limited by fatigue  [] Patient limited by pain  [] Patient limited by other medical complications  [] Other:     Assessment: Flare-up over the weekend with increased activity L UE from cooking, is improving but increased soreness still present with movement so modified wrist flexion stretch to tolerance with slight elbow bend. Pt reported did feel some relief after entire treatment today with not feeling as sore with movement. Pt to benefit from further PT to return to PLOF. Prognosis: [x] Good [] Fair  [] Poor    Patient Requires Follow-up: [x] Yes  [] No    Goals:  Short term goals  Time Frame for Short term goals: 2 weeks  Short term goal 1: Pt will demo good understanding and knowledge of initial HEP  Short term goal 2: Pt with only mild tenderness L lateral epicondyle  Long term goals  Time Frame for Long term goals : 4 weeks  Long term goal 1: Pt will demo good understanding and knowledge of HEP progressions  Long term goal 2: Decreased pain 1/10 at worst to allow for N functional mobility as previous  Long term goal 3: AROM L shoulder IR reach WFLs and wrist ext WFLs with elbow extended each without pain to allow for N reaching tasks  Long term goal 4: Improve strength with L shoulder flex/abd and L wrist ext 5/5 to allow for N carrying/lifting without increased pain  Long term goal 5: Decreased impairment per UEFI <10% impaired    Plan:   Visits per week:  2  # of weeks:  4    [x] Continue per plan of care [] Alter current plan (see comments)  [] Plan of care initiated [] Hold pending MD visit [] Discharge    Plan for Next Session: review HEP, add exercises/manual as tolerated     Electronically signed by:   Elizabeth Mcdaniels DPT [id number]

## 2021-03-09 NOTE — ASSESSMENT & PLAN NOTE
Area to abdomen is improving. She has completed her antibiotics and tolerated well. We will start her on topical clindamycin for preventative management of hidradenitis suppurativa. Dermatology referral also provided.

## 2021-03-09 NOTE — PROGRESS NOTES
Alison Nunez (:  1970) is a 44 y.o. female,Established patient, here for evaluation of the following chief complaint(s):  2 Week Follow-Up      ASSESSMENT/PLAN:  1. Pre-diabetes  -     POCT glycosylated hemoglobin (Hb A1C)  2. Hidradenitis suppurativa  Assessment & Plan:  Area to abdomen is improving. She has completed her antibiotics and tolerated well. We will start her on topical clindamycin for preventative management of hidradenitis suppurativa. Dermatology referral also provided. Orders:  -     [facility] [address]  3. Lateral epicondylitis of left elbow  Assessment & Plan:  She is following with physical therapy she is having some pain after having a few visits with therapy but is hopeful things will improve. 4. Elevated BP without diagnosis of hypertension  Assessment & Plan:  Blood pressure is elevated today but it is normally well controlled we will continue to monitor if remains elevated will begin treatment. No follow-ups on file. SUBJECTIVE/OBJECTIVE:  EDUARDA  Melina Sherly is in the office today to follow up from open wound r/t HS. She was started on doxy, culture showed sensitivity. She states that the area is cleared up. She states that she would like new derm referral. Her HS has been an ongoing issue for her, and has multiple areas of scaring from previous abscess. She is also following up on left arm, she is following up with PT and starting this. Her BP is elevated today, but she is taking her medications. Current Outpatient Medications   Medication Sig Dispense Refill    clindamycin (CLEOCIN-T) 1 % gel Apply topically 2 times daily. 60 g 5    mupirocin (BACTROBAN) 2 % cream Apply 3 times daily.  30 g 0    ibuprofen (ADVIL;MOTRIN) 800 MG tablet Take 1 tablet by mouth every 8 hours as needed for Pain 30 tablet 0    naproxen (NAPROSYN) 250 MG tablet Take 1 tablet by mouth 2 times daily as needed for Pain 60 tablet 0    nystatin (MYCOSTATIN) 543676 UNIT/GM powder Apply 3 times daily. 45 g 0    nystatin (MYCOSTATIN) 531488 UNIT/GM cream Apply topically 2 times daily. 30 g 1    hydroquinone 4 % cream Apply topically 2 times daily. 30 g 2    naproxen (NAPROSYN) 500 MG tablet Take 1 tablet by mouth 2 times daily (with meals) 30 tablet 0    terbinafine (ATHLETES FOOT) 1 % cream Apply topically 2 times daily. 15 g 0    Cholecalciferol (VITAMIN D3) 25 MCG (1000 UT) TABS Take 2 tablets by mouth daily 180 tablet 0    naproxen (NAPROSYN) 500 MG tablet Take 1 tablet by mouth 2 times daily for 10 days 20 tablet 0     No current facility-administered medications for this visit. Review of Systems   Constitutional: Negative for chills, fatigue and fever. HENT: Negative for congestion, ear pain, postnasal drip, rhinorrhea, sinus pressure, sneezing and sore throat. Eyes: Negative for redness and itching. Respiratory: Negative for cough, chest tightness, shortness of breath and wheezing. Cardiovascular: Negative for chest pain and palpitations. Gastrointestinal: Negative for abdominal pain, blood in stool, constipation, diarrhea, nausea and vomiting. Endocrine: Negative for cold intolerance and heat intolerance. Genitourinary: Negative for difficulty urinating, dysuria, flank pain, frequency, hematuria and urgency. Musculoskeletal: Negative for arthralgias, back pain, joint swelling and myalgias. Skin: Negative for color change, pallor, rash and wound. Allergic/Immunologic: Negative for environmental allergies and food allergies. Neurological: Negative for dizziness, seizures, syncope, weakness, light-headedness, numbness and headaches. Hematological: Negative for adenopathy. Does not bruise/bleed easily. Psychiatric/Behavioral: Negative for confusion, sleep disturbance and suicidal ideas. The patient is not nervous/anxious and is not hyperactive.         Vitals:    03/09/21 1100 03/09/21 1133   BP: (!) 154/84 139/89   Site: Left Upper Arm    Position: Sitting    Cuff Size: Large Adult    Pulse: 78    Temp: 97.1 °F (36.2 °C)    TempSrc: Temporal    SpO2: 98%    Weight: 214 lb (97.1 kg)    Height: 5' 2\" (1.575 m)        Physical Exam  Constitutional:       Appearance: Normal appearance. She is well-developed. HENT:      Head: Normocephalic and atraumatic. Right Ear: Hearing normal.      Left Ear: Hearing normal.      Nose: No mucosal edema. Right Sinus: No maxillary sinus tenderness or frontal sinus tenderness. Left Sinus: No maxillary sinus tenderness or frontal sinus tenderness. Mouth/Throat: Tonsils: No tonsillar abscesses. Eyes:      Extraocular Movements: Extraocular movements intact. Pupils: Pupils are equal, round, and reactive to light. Cardiovascular:      Rate and Rhythm: Normal rate and regular rhythm. Pulses: Normal pulses. Heart sounds: Normal heart sounds. Pulmonary:      Effort: Pulmonary effort is normal.      Breath sounds: Normal breath sounds. Lymphadenopathy:      Head:      Right side of head: No submental, submandibular, tonsillar, preauricular, posterior auricular or occipital adenopathy. Left side of head: No submental, submandibular, tonsillar, preauricular, posterior auricular or occipital adenopathy. Skin:     General: Skin is warm and dry. Neurological:      Mental Status: She is alert. Psychiatric:         Mood and Affect: Mood normal.         Behavior: Behavior normal.           On this date 3/9/2021 I have spent 30 minutes reviewing previous notes, test results and face to face with the patient discussing the diagnosis and importance of compliance with the treatment plan as well as documenting on the day of the visit. An electronic signature was used to authenticate this note.     --Olamide Mayers, APRN - CNP

## 2021-03-09 NOTE — ASSESSMENT & PLAN NOTE
She is following with physical therapy she is having some pain after having a few visits with therapy but is hopeful things will improve.

## 2021-03-12 ENCOUNTER — HOSPITAL ENCOUNTER (OUTPATIENT)
Dept: PHYSICAL THERAPY | Age: 51
Setting detail: THERAPIES SERIES
Discharge: HOME | End: 2021-03-12
Payer: COMMERCIAL

## 2021-03-12 PROCEDURE — 97140 MANUAL THERAPY 1/> REGIONS: CPT

## 2021-03-12 PROCEDURE — 97110 THERAPEUTIC EXERCISES: CPT

## 2021-03-12 NOTE — FLOWSHEET NOTE
Physical Therapy Daily Treatment Note  Date:  3/12/2021    Patient Name:  Shayan Cruz    :  1970  MRN: [id number]  Restrictions/Precautions:   NA   Medical/Treatment Diagnosis Information:  · Diagnosis: Lateral epicondylitis of left elbow (M77.12)  · Treatment Diagnosis: L elbow pain  Insurance/Certification information:  PT Insurance Information: [facility], 30 visits per  year  Physician Information:  Referring Practitioner: SILVIA Parisi CNP, MD Follow-up Visit: ?  Plan of care signed (Y/N):  Y   Visit# / total visits:  3/8  Pain level: 0/10 at rest     Progress Note Due (10 visits or 30 days, whichever is less):    Recertification Note Due (End of POC or 90 days, whichever is less):      Subjective:  2021: Pt reports began to have L elbow pain Oct/2020 without known cause. Pt states that it is improving but pain is still present. Pt notes that her pain ranges from 0/10 at best to 5/10 at worst.  Pain is worse with reaching across body or behind her back, if bumps elbow and with carrying/lifting. Pt did take Naproxen as prescribed and is now finished with prescription - seemed to help decrease pain as now is not as severe as it used to be. Pt reports pain is described as achy pain, and used to be sharp. Denies N/T. Pt is R hand dominant but does do some things with L hand. PLOF: No pain or limitations. 2021: Pt reports no complaints after  and Saturday felt okay. Pt reports cooked all day  and was sore all over, more pain in L elbow yesterday and last night used ice which helped and is better today. Is sore with movement of elbow especially full elbow extension today. 2021:  Pt reports that she has felt relief since last visit. Is now able to do stretch with elbow extended.              Objective:2021   Observation:    Palpation: moderate tenderness along L lateral epicondyle, mild tenderness/tightness along wrist extensor muscle belly proximally     Test measurements:     AROM RUE (degrees)  RUE AROM : WFL  AROM LUE (degrees)  LUE AROM : WFL  LUE General AROM: with the exception of IR reach pt \"loosened arm up first\" and was effortful but = R.  Elbow WFLs, wrist WFLs with mild pain at endrange ext with elbow extended     Strength RLE  Comment: shoulder/elbow/wrist 5/5, R  on PAULETTE Lv 2: 70#, 65#, 65#  Strength LLE  Comment: shoulder flex/abd 4/5 each with pain, IR/ER 5/5 without pain, elbow 5/5, wrist ext 4+/5, flex 5/5. L  on PAULETTE Lv 2: 70#, 75#, 65#  Additional Measures  Special Tests: (-) Cozen and (-) Mill's test L  Other: UEFI = 30% impaired    Exercises, Neuro Facilitation & Gait Training (66912, [phone], [id number]): Activity Resistance/Repetitions Other comments   Wrist flex/ext stretch with elbow extended 15\" x 3 each L Able to resume with elbow extended        Wrist ext strengthening With eccentric focus  Red x 10 L    Shoulder flexion Red x 10 L Could feel area \"working\"                                                       Therapeutic Activities (43541):     Home Exercise Program:   Pt. demonstrated good understanding and knowledge of HEP. Written instructions provided. 03/05/2021: wrist flex/ext stretches. Access Code: [email or web address] Patient Portal: [employer]/    Manual Treatments (93840):  Gentle cross friction to L wrist ext muscle belly with pt resting L UE on pillow in sitting, good tolerance reported - still with mild tightness    Modalities: US L lateral epicondyle 8 mins, 3 MHz, 1.0 w/cm2, 20% with pt resting L UE on pillow in sitting, good tolerance reported    Timed Code Treatment Minutes:  TE: 16, US: 8, MT: 8    Total Treatment Minutes:  32     Treatment/Activity Tolerance:  [x] Patient tolerated treatment well [] Patient limited by fatigue  [] Patient limited by pain  [] Patient limited by other medical complications  [] Other:     Assessment: good tolerance to added exercises today without increased pain, overall decreasing pain. Pt to benefit from further PT to return to PLOF. Prognosis: [x] Good [] Fair  [] Poor    Patient Requires Follow-up: [x] Yes  [] No    Goals:  Short term goals  Time Frame for Short term goals: 2 weeks  Short term goal 1: Pt will demo good understanding and knowledge of initial HEP  Short term goal 2: Pt with only mild tenderness L lateral epicondyle  Long term goals  Time Frame for Long term goals : 4 weeks  Long term goal 1: Pt will demo good understanding and knowledge of HEP progressions  Long term goal 2: Decreased pain 1/10 at worst to allow for N functional mobility as previous  Long term goal 3: AROM L shoulder IR reach WFLs and wrist ext WFLs with elbow extended each without pain to allow for N reaching tasks  Long term goal 4: Improve strength with L shoulder flex/abd and L wrist ext 5/5 to allow for N carrying/lifting without increased pain  Long term goal 5: Decreased impairment per UEFI <10% impaired    Plan:   Visits per week:  2  # of weeks:  4    [x] Continue per plan of care [] Alter current plan (see comments)  [] Plan of care initiated [] Hold pending MD visit [] Discharge    Plan for Next Session: review HEP, add exercises/manual as tolerated     Electronically signed by:   Jersey Guajardo DPT [id number]

## 2021-03-16 ENCOUNTER — HOSPITAL ENCOUNTER (OUTPATIENT)
Dept: PHYSICAL THERAPY | Age: 51
Setting detail: THERAPIES SERIES
Discharge: HOME | End: 2021-03-16
Payer: COMMERCIAL

## 2021-03-16 PROCEDURE — 97140 MANUAL THERAPY 1/> REGIONS: CPT

## 2021-03-16 PROCEDURE — 97110 THERAPEUTIC EXERCISES: CPT

## 2021-03-16 NOTE — FLOWSHEET NOTE
Physical Therapy Daily Treatment Note  Date:  3/16/2021    Patient Name:  Hoda Ahn    :  1970  MRN: [id number]  Restrictions/Precautions:   NA   Medical/Treatment Diagnosis Information:  · Diagnosis: Lateral epicondylitis of left elbow (M77.12)  · Treatment Diagnosis: L elbow pain  Insurance/Certification information:  PT Insurance Information: [facility], 30 visits per  year  Physician Information:  Referring Practitioner: SILVIA Teague CNP, MD Follow-up Visit: ?  Plan of care signed (Y/N):  Y   Visit# / total visits:  4/8  Pain level: 4/10 at rest     Progress Note Due (10 visits or 30 days, whichever is less):    Recertification Note Due (End of POC or 90 days, whichever is less):      Subjective:  2021: Pt reports began to have L elbow pain Oct/2020 without known cause. Pt states that it is improving but pain is still present. Pt notes that her pain ranges from 0/10 at best to 5/10 at worst.  Pain is worse with reaching across body or behind her back, if bumps elbow and with carrying/lifting. Pt did take Naproxen as prescribed and is now finished with prescription - seemed to help decrease pain as now is not as severe as it used to be. Pt reports pain is described as achy pain, and used to be sharp. Denies N/T. Pt is R hand dominant but does do some things with L hand. PLOF: No pain or limitations. 2021: Pt reports no complaints after  and Saturday felt okay. Pt reports cooked all day  and was sore all over, more pain in L elbow yesterday and last night used ice which helped and is better today. Is sore with movement of elbow especially full elbow extension today. 2021:  Pt reports that she has felt relief since last visit. Is now able to do stretch with elbow extended. 2021: Pt noted felt great after last visit. However, can still feel some pain.                Objective:2021   Observation: Palpation: moderate tenderness along L lateral epicondyle, mild tenderness/tightness along wrist extensor muscle belly proximally     Test measurements:     AROM RUE (degrees)  RUE AROM : WFL  AROM LUE (degrees)  LUE AROM : WFL  LUE General AROM: with the exception of IR reach pt \"loosened arm up first\" and was effortful but = R.  Elbow WFLs, wrist WFLs with mild pain at endrange ext with elbow extended     Strength RLE  Comment: shoulder/elbow/wrist 5/5, R  on PAULETTE Lv 2: 70#, 65#, 65#  Strength LLE  Comment: shoulder flex/abd 4/5 each with pain, IR/ER 5/5 without pain, elbow 5/5, wrist ext 4+/5, flex 5/5. L  on PAULETTE Lv 2: 70#, 75#, 65#  Additional Measures  Special Tests: (-) Cozen and (-) Mill's test L  Other: UEFI = 30% impaired    Exercises, Neuro Facilitation & Gait Training (71477, [id number], [id number]): Activity Resistance/Repetitions Other comments   Wrist flex/ext stretch with elbow extended 15\" x 3 each L Able to resume with elbow extended        Wrist ext strengthening With eccentric focus  Red x 10 L    Shoulder flexion Red x 10 L Cues for height                                                       Therapeutic Activities (47517):     Home Exercise Program:   Pt. demonstrated good understanding and knowledge of HEP. Written instructions provided. 03/05/2021: wrist flex/ext stretches. Access Code: [email or web address] Patient Portal: [employer]/  03/16/2021: wrist ext t-band, shoulder flex t-band.  Access Code [email or web address]      Manual Treatments (46831):  Gentle cross friction to L wrist ext muscle belly with pt resting L UE on pillow in sitting, good tolerance reported - still with mild tightness    Modalities: US L lateral epicondyle 8 mins, 3 MHz, 1.0 w/cm2, 20% with pt resting L UE on pillow in sitting, good tolerance reported    Timed Code Treatment Minutes:  TE: 19, US: 8, MT: 8    Total Treatment Minutes:  35     Treatment/Activity Tolerance:  [x] Patient tolerated treatment well [] Patient limited by fatigue  [] Patient limited by pain  [] Patient limited by other medical complications  [] Other:     Assessment: good tolerance to treatment today without increased pain with t-band exercises despite being more sore to begin with. Pt to benefit from further PT to return to PLOF. Prognosis: [x] Good [] Fair  [] Poor    Patient Requires Follow-up: [x] Yes  [] No    Goals:  Short term goals  Time Frame for Short term goals: 2 weeks  Short term goal 1: Pt will demo good understanding and knowledge of initial HEP  Short term goal 2: Pt with only mild tenderness L lateral epicondyle  Long term goals  Time Frame for Long term goals : 4 weeks  Long term goal 1: Pt will demo good understanding and knowledge of HEP progressions  Long term goal 2: Decreased pain 1/10 at worst to allow for N functional mobility as previous  Long term goal 3: AROM L shoulder IR reach WFLs and wrist ext WFLs with elbow extended each without pain to allow for N reaching tasks  Long term goal 4: Improve strength with L shoulder flex/abd and L wrist ext 5/5 to allow for N carrying/lifting without increased pain  Long term goal 5: Decreased impairment per UEFI <10% impaired    Plan:   Visits per week:  2  # of weeks:  4    [x] Continue per plan of care [] Alter current plan (see comments)  [] Plan of care initiated [] Hold pending MD visit [] Discharge    Plan for Next Session: review HEP, add exercises/manual as tolerated     Electronically signed by:   Elizabeth Mcdaniels DPT [id number]

## 2021-03-19 ENCOUNTER — HOSPITAL ENCOUNTER (OUTPATIENT)
Dept: PHYSICAL THERAPY | Age: 51
Setting detail: THERAPIES SERIES
Discharge: HOME | End: 2021-03-19
Payer: COMMERCIAL

## 2021-03-19 NOTE — CARE COORDINATION
Physical Therapy  Cancellation/No-show Note  Patient Name:  Hoda Ahn  :  1970   Date:  3/19/2021  MRN: [id number]  Cancelled visits to date: 1  2021  No-shows to date: 0    For today's appointment patient:  [x]  Cancelled  []  Rescheduled appointment  []  No-show     Reason given by patient:  []  Patient ill  []  Conflicting appointment  []  No transportation    [x]  Conflict with work - picked up a shift  []  No reason given  []  Other:     Comments:      Electronically signed by:   Raysa Bravo, [address] [facility], DPT [id number]

## 2021-03-23 ENCOUNTER — HOSPITAL ENCOUNTER (OUTPATIENT)
Dept: PHYSICAL THERAPY | Age: 51
Setting detail: THERAPIES SERIES
Discharge: HOME | End: 2021-03-23
Payer: COMMERCIAL

## 2021-03-23 PROCEDURE — 97140 MANUAL THERAPY 1/> REGIONS: CPT

## 2021-03-23 PROCEDURE — 97110 THERAPEUTIC EXERCISES: CPT

## 2021-03-23 NOTE — FLOWSHEET NOTE
Physical Therapy Daily Treatment Note  Date:  3/23/2021    Patient Name:  Nancy De León    :  1970  MRN: [id number]  Restrictions/Precautions:   NA   Medical/Treatment Diagnosis Information:  · Diagnosis: Lateral epicondylitis of left elbow (M77.12)  · Treatment Diagnosis: L elbow pain  Insurance/Certification information:  PT Insurance Information: [facility], 30 visits per  year  Physician Information:  Referring Practitioner: SILVIA Feliz CNP, MD Follow-up Visit: ?  Plan of care signed (Y/N):  Y   Visit# / total visits:  5/8  Pain level: 4/10 with movement, 0/10 at rest     Progress Note Due (10 visits or 30 days, whichever is less):    Recertification Note Due (End of POC or 90 days, whichever is less):      Subjective:  2021: Pt reports began to have L elbow pain Oct/2020 without known cause. Pt states that it is improving but pain is still present. Pt notes that her pain ranges from 0/10 at best to 5/10 at worst.  Pain is worse with reaching across body or behind her back, if bumps elbow and with carrying/lifting. Pt did take Naproxen as prescribed and is now finished with prescription - seemed to help decrease pain as now is not as severe as it used to be. Pt reports pain is described as achy pain, and used to be sharp. Denies N/T. Pt is R hand dominant but does do some things with L hand. PLOF: No pain or limitations. 2021: Pt reports no complaints after  and Saturday felt okay. Pt reports cooked all day  and was sore all over, more pain in L elbow yesterday and last night used ice which helped and is better today. Is sore with movement of elbow especially full elbow extension today. 2021:  Pt reports that she has felt relief since last visit. Is now able to do stretch with elbow extended. 2021: Pt noted felt great after last visit.   However, can still feel some pain.        2021:  Overall less pain, does however feel some soreness going up into upper arm. Notes keeps bumping elbow which causes pain and sleeps with elbow bent which is also irritating. Objective:03/05/2021   Observation:    Palpation: moderate tenderness along L lateral epicondyle, mild tenderness/tightness along wrist extensor muscle belly proximally     Test measurements:     AROM RUE (degrees)  RUE AROM : WFL  AROM LUE (degrees)  LUE AROM : WFL  LUE General AROM: with the exception of IR reach pt \"loosened arm up first\" and was effortful but = R.  Elbow WFLs, wrist WFLs with mild pain at endrange ext with elbow extended     Strength RLE  Comment: shoulder/elbow/wrist 5/5, R  on PAULETTE Lv 2: 70#, 65#, 65#  Strength LLE  Comment: shoulder flex/abd 4/5 each with pain, IR/ER 5/5 without pain, elbow 5/5, wrist ext 4+/5, flex 5/5. L  on PAULETTE Lv 2: 70#, 75#, 65#  Additional Measures  Special Tests: (-) Cozen and (-) Mill's test L  Other: UEFI = 30% impaired    Exercises, Neuro Facilitation & Gait Training (89367, [phone], [id number]): Activity Resistance/Repetitions Other comments   Wrist flex/ext stretch with elbow extended 15\" x 3 each L Able to resume with elbow extended  -verbal review - emphasized        Wrist ext strengthening With eccentric focus  Red x 10 L    Shoulder flexion Red x 10 L Cues for height                                                       Therapeutic Activities (30233): Encouraged not excessively bending elbows when sleeping     Home Exercise Program:   Pt. demonstrated good understanding and knowledge of HEP. Written instructions provided. 03/05/2021: wrist flex/ext stretches. Access Code: [email or web address] Patient Portal: [email or web address]/  03/16/2021: wrist ext t-band, shoulder flex t-band.  Access Code [email or web address]      Manual Treatments (79735):  Gentle cross friction to L wrist ext muscle belly with pt resting L UE on pillow in sitting, good tolerance reported - still with mild tightness    Modalities: US L lateral epicondyle 8 mins, 3 MHz, 1.0 w/cm2, 20% with pt resting L UE on pillow in sitting, good tolerance reported    Timed Code Treatment Minutes:  TE: 14, US: 8, MT: 8    Total Treatment Minutes:  30     Treatment/Activity Tolerance:  [x] Patient tolerated treatment well [] Patient limited by fatigue  [] Patient limited by pain  [] Patient limited by other medical complications  [] Other:     Assessment: good tolerance with exercises today, overall less pain. Pt to benefit from further PT to return to PLOF. Prognosis: [x] Good [] Fair  [] Poor    Patient Requires Follow-up: [x] Yes  [] No    Goals:  Short term goals  Time Frame for Short term goals: 2 weeks  Short term goal 1: Pt will demo good understanding and knowledge of initial HEP  Short term goal 2: Pt with only mild tenderness L lateral epicondyle  Long term goals  Time Frame for Long term goals : 4 weeks  Long term goal 1: Pt will demo good understanding and knowledge of HEP progressions  Long term goal 2: Decreased pain 1/10 at worst to allow for N functional mobility as previous  Long term goal 3: AROM L shoulder IR reach WFLs and wrist ext WFLs with elbow extended each without pain to allow for N reaching tasks  Long term goal 4: Improve strength with L shoulder flex/abd and L wrist ext 5/5 to allow for N carrying/lifting without increased pain  Long term goal 5: Decreased impairment per UEFI <10% impaired    Plan:   Visits per week:  2  # of weeks:  4    [x] Continue per plan of care [] Alter current plan (see comments)  [] Plan of care initiated [] Hold pending MD visit [] Discharge    Plan for Next Session: review HEP, add exercises/manual as tolerated     Electronically signed by:   Kiran Cordova DPT [id number]

## 2021-03-26 ENCOUNTER — HOSPITAL ENCOUNTER (OUTPATIENT)
Dept: PHYSICAL THERAPY | Age: 51
Setting detail: THERAPIES SERIES
Discharge: HOME | End: 2021-03-26
Payer: COMMERCIAL

## 2021-03-26 PROCEDURE — 97140 MANUAL THERAPY 1/> REGIONS: CPT

## 2021-03-26 PROCEDURE — 97035 APP MDLTY 1+ULTRASOUND EA 15: CPT

## 2021-03-26 PROCEDURE — 97110 THERAPEUTIC EXERCISES: CPT

## 2021-03-26 NOTE — FLOWSHEET NOTE
Physical Therapy Daily Treatment Note  Date:  3/26/2021    Patient Name:  Germain Merlin    :  1970  MRN: [id number]  Restrictions/Precautions:   NA   Medical/Treatment Diagnosis Information:  · Diagnosis: Lateral epicondylitis of left elbow (M77.12)  · Treatment Diagnosis: L elbow pain  Insurance/Certification information:  PT Insurance Information: [facility], 30 visits per  year  Physician Information:  Referring Practitioner: SILVIA Sears - CNP  MD Follow-up Visit: ?  Plan of care signed (Y/N):  Y   Visit# / total visits:  6/8  Pain level: 4/10 with movement, 0/10 at rest     Progress Note Due (10 visits or 30 days, whichever is less):    Recertification Note Due (End of POC or 90 days, whichever is less):      Subjective:  2021: Pt reports began to have L elbow pain Oct/2020 without known cause. Pt states that it is improving but pain is still present. Pt notes that her pain ranges from 0/10 at best to 5/10 at worst.  Pain is worse with reaching across body or behind her back, if bumps elbow and with carrying/lifting. Pt did take Naproxen as prescribed and is now finished with prescription - seemed to help decrease pain as now is not as severe as it used to be. Pt reports pain is described as achy pain, and used to be sharp. Denies N/T. Pt is R hand dominant but does do some things with L hand. PLOF: No pain or limitations. 2021: Pt reports no complaints after  and Saturday felt okay. Pt reports cooked all day  and was sore all over, more pain in L elbow yesterday and last night used ice which helped and is better today. Is sore with movement of elbow especially full elbow extension today. 2021:  Pt reports that she has felt relief since last visit. Is now able to do stretch with elbow extended. 2021: Pt noted felt great after last visit.   However, can still feel some pain.        2021:  Overall less pain, does however feel some soreness going up into upper arm. Notes keeps bumping elbow which causes pain and sleeps with elbow bent which is also irritating. 03/23/2021: Pt reports she is no longer sleeping with elbow flexed or sleeping directly on the L elbow. Improvement in pain noted with this sleeping strategy. Pt is still bumping her elbow into objects when she is walking which causes pain. Pt reports her daughter owns a continuous fluid cryotherapy and she may use it for her elbow. Objective:03/05/2021   Observation:    Palpation: moderate tenderness along L lateral epicondyle, mild tenderness/tightness along wrist extensor muscle belly proximally     Test measurements:     AROM RUE (degrees)  RUE AROM : WFL  AROM LUE (degrees)  LUE AROM : WFL  LUE General AROM: with the exception of IR reach pt \"loosened arm up first\" and was effortful but = R.  Elbow WFLs, wrist WFLs with mild pain at endrange ext with elbow extended     Strength RLE  Comment: shoulder/elbow/wrist 5/5, R  on PAULETTE Lv 2: 70#, 65#, 65#  Strength LLE  Comment: shoulder flex/abd 4/5 each with pain, IR/ER 5/5 without pain, elbow 5/5, wrist ext 4+/5, flex 5/5. L  on PAULETTE Lv 2: 70#, 75#, 65#  Additional Measures  Special Tests: (-) Cozen and (-) Mill's test L  Other: UEFI = 30% impaired    Exercises, Neuro Facilitation & Gait Training (26497, [id number], [id number]): Activity Resistance/Repetitions Other comments   Wrist flex/ext stretch with elbow extended 15\" x 3 each L Able to resume with elbow extended  -verbal review - emphasized        Wrist ext strengthening With eccentric focus  Red x 10 L    Shoulder flexion Red x 10 L Cues for height. Attempted pronated  but too painful and decrease shoulder flexion range. Moved back to neutral wrist position with less pain noted and full shoulder flexion range.    L hand Squeezing Putty 30'' X 2 Slight pain noted by end of intervention Therapeutic Activities (73762): Pt educated to try using lateral epicondylitis strap on L elbow to redirect pull at lateral epicondyle to reduce pain at extensor origin . Pt states she will bring the strap in next visit if she is able to stop at the store to buy it. Pt also educated if she uses the continuous fluid cryotherapy at home to be precautious of superficial ulnar nerve and weakness/numbess that may follow after immediate use. Home Exercise Program:   Pt. demonstrated good understanding and knowledge of HEP. Written instructions provided. 03/05/2021: wrist flex/ext stretches. Access Code: [email or web address] Patient Portal: [email or web address]/  03/16/2021: wrist ext t-band, shoulder flex t-band. Access Code [email or web address]  03/26/2021: Putty squeezes in L hand 30 seconds X 3. Manual Treatments (66559):  Gentle cross friction to L wrist ext muscle belly with pt resting L UE on pillow in sitting, good tolerance reported - still with mild tightness    Modalities: US L lateral epicondyle 8 mins, 3 MHz, 1.0 w/cm2, 20% with pt resting L UE on pillow in sitting, good tolerance reported. Cold pack applied for 8 minutes with pt reports of reduction in sxs. Timed Code Treatment Minutes:  TE: 22, US: 8, MT: 10     Total Treatment Minutes:  48 (8 minutes of CP)    Treatment/Activity Tolerance:  [x] Patient tolerated treatment well [] Patient limited by fatigue  [] Patient limited by pain  [] Patient limited by other medical complications  [] Other:     Assessment: Good tolerance with addition of new ther ex intervention and good response to cold pack application at end of visit. Pt to benefit from further PT to return to PLOF.     Prognosis: [x] Good [] Fair  [] Poor    Patient Requires Follow-up: [x] Yes  [] No    Goals:  Short term goals  Time Frame for Short term goals: 2 weeks  Short term goal 1: Pt will demo good understanding and knowledge of initial HEP  Short term goal 2: Pt with only mild tenderness L lateral epicondyle  Long term goals  Time Frame for Long term goals : 4 weeks  Long term goal 1: Pt will demo good understanding and knowledge of HEP progressions  Long term goal 2: Decreased pain 1/10 at worst to allow for N functional mobility as previous  Long term goal 3: AROM L shoulder IR reach WFLs and wrist ext WFLs with elbow extended each without pain to allow for N reaching tasks  Long term goal 4: Improve strength with L shoulder flex/abd and L wrist ext 5/5 to allow for N carrying/lifting without increased pain  Long term goal 5: Decreased impairment per UEFI <10% impaired    Plan:   Visits per week:  2  # of weeks:  4    [x] Continue per plan of care [] Alter current plan (see comments)  [] Plan of care initiated [] Hold pending MD visit [] Discharge    Plan for Next Session: review HEP, add exercises/manual as tolerated     Electronically signed by: SUNNY Harrison  Therapist was present, directed the patient's care, made skilled judgement, and was responsible for assessment and treatment of the patient.   Esther Alba, DPT [id number]

## 2021-03-30 ENCOUNTER — HOSPITAL ENCOUNTER (OUTPATIENT)
Dept: PHYSICAL THERAPY | Age: 51
Setting detail: THERAPIES SERIES
Discharge: HOME | End: 2021-03-30
Payer: COMMERCIAL

## 2021-03-30 PROCEDURE — 97035 APP MDLTY 1+ULTRASOUND EA 15: CPT

## 2021-03-30 PROCEDURE — 97140 MANUAL THERAPY 1/> REGIONS: CPT

## 2021-03-30 PROCEDURE — 97110 THERAPEUTIC EXERCISES: CPT

## 2021-03-30 NOTE — FLOWSHEET NOTE
Physical Therapy Daily Treatment Note  Date:  3/30/2021    Patient Name:  Alexandro Billingsley    :  1970  MRN: [id number]  Restrictions/Precautions:   NA   Medical/Treatment Diagnosis Information:  · Diagnosis: Lateral epicondylitis of left elbow (M77.12)  · Treatment Diagnosis: L elbow pain  Insurance/Certification information:  PT Insurance Information: [facility], 30 visits per  year  Physician Information:  Referring Practitioner: SILVIA Membreno CNP, MD Follow-up Visit: ?  Plan of care signed (Y/N):  Y   Visit# / total visits:  7/8  Pain level: 3/10 with movement, 0/10 at rest     Progress Note Due (10 visits or 30 days, whichever is less):    Recertification Note Due (End of POC or 90 days, whichever is less):      Subjective:  2021: Pt reports began to have L elbow pain Oct/2020 without known cause. Pt states that it is improving but pain is still present. Pt notes that her pain ranges from 0/10 at best to 5/10 at worst.  Pain is worse with reaching across body or behind her back, if bumps elbow and with carrying/lifting. Pt did take Naproxen as prescribed and is now finished with prescription - seemed to help decrease pain as now is not as severe as it used to be. Pt reports pain is described as achy pain, and used to be sharp. Denies N/T. Pt is R hand dominant but does do some things with L hand. PLOF: No pain or limitations. 2021: Pt reports no complaints after  and Saturday felt okay. Pt reports cooked all day  and was sore all over, more pain in L elbow yesterday and last night used ice which helped and is better today. Is sore with movement of elbow especially full elbow extension today. 2021:  Pt reports that she has felt relief since last visit. Is now able to do stretch with elbow extended. 2021: Pt noted felt great after last visit.   However, can still feel some pain.        2021:  Overall less Therapeutic Activities (88468):     Home Exercise Program:   Pt. demonstrated good understanding and knowledge of HEP. Written instructions provided. 03/05/2021: wrist flex/ext stretches. Access Code: [email or web address] Patient Portal: [employer]/  03/16/2021: wrist ext t-band, shoulder flex t-band. Access Code [email or web address]  03/26/2021: Putty squeezes in L hand 30 seconds X 3. Manual Treatments (22179):  Gentle cross friction to L wrist ext muscle belly with pt resting L UE on pillow in sitting, good tolerance reported - still with mild tightness although is improving    Modalities: US L lateral epicondyle 8 mins, 3 MHz, 1.0 w/cm2, 20% with pt resting L UE on pillow in sitting, good tolerance reported. Pt declined CP as was feeling good after treatment today  Timed Code Treatment Minutes:  TE: 18, US: 8, MT: 12    Total Treatment Minutes:  38    Treatment/Activity Tolerance:  [x] Patient tolerated treatment well [] Patient limited by fatigue  [] Patient limited by pain  [] Patient limited by other medical complications  [] Other:     Assessment: Pt notes no pain with movement after treatment today. Overall decreasing symptoms of pain allowing improving mobility of L UE. Pt to benefit from further PT to return to PLOF.     Prognosis: [x] Good [] Fair  [] Poor    Patient Requires Follow-up: [x] Yes  [] No    Goals:  Short term goals  Time Frame for Short term goals: 2 weeks  Short term goal 1: Pt will demo good understanding and knowledge of initial HEP  Short term goal 2: Pt with only mild tenderness L lateral epicondyle  Long term goals  Time Frame for Long term goals : 4 weeks  Long term goal 1: Pt will demo good understanding and knowledge of HEP progressions  Long term goal 2: Decreased pain 1/10 at worst to allow for N functional mobility as previous  Long term goal 3: AROM L shoulder IR reach WFLs and wrist ext WFLs with elbow extended each without pain to allow for N reaching tasks  Long term goal 4: Improve strength with L shoulder flex/abd and L wrist ext 5/5 to allow for N carrying/lifting without increased pain  Long term goal 5: Decreased impairment per UEFI <10% impaired    Plan:   Visits per week:  2  # of weeks:  4    [x] Continue per plan of care [] Alter current plan (see comments)  [] Plan of care initiated [] Hold pending MD visit [] Discharge    Plan for Next Session: review HEP, add exercises/manual as tolerated     Electronically signed by:   Sonal Ortega, [address] [facility], DPT [id number]

## 2021-04-02 ENCOUNTER — HOSPITAL ENCOUNTER (OUTPATIENT)
Dept: PHYSICAL THERAPY | Age: 51
Setting detail: THERAPIES SERIES
Discharge: HOME | End: 2021-04-02
Payer: COMMERCIAL

## 2021-04-02 PROCEDURE — 97110 THERAPEUTIC EXERCISES: CPT

## 2021-04-02 PROCEDURE — 97140 MANUAL THERAPY 1/> REGIONS: CPT

## 2021-04-02 PROCEDURE — 97035 APP MDLTY 1+ULTRASOUND EA 15: CPT

## 2021-04-02 NOTE — PROGRESS NOTES
Outpatient Physical Therapy  Phone: [phone] Fax: [phone]    To: SILVIA Reinoso - [place] From: Glenny Sky PT, DPT [id number]   Date: 2021  Patient: Gen Sigala     : 1970 MRN: [id number]  Medical Diagnosis:  Lateral epicondylitis of left elbow (M77.12)  Treatment Diagnosis:  L elbow pain      Physical Therapy Progress Note    Time Period for Report:  2021 - 2021    Total Visits to date:   8 Cancels/No-shows to date:      Plan of Care/Treatment to date:  [x] Therapeutic Exercise (Review/Progress HEP and provide verbal/tactile cueing for activities related to strengthening, flexibility,  endurance, ROM.)       [x] Therapeutic Activity (Provide verbal/tactile cueing for dynamic activities to promote functional tasks.)          [] Gait Training (Provide verbal/tactile/visual cueing for facilitation of normalized gait pattern without or with the least restrictive AD to decrease pain and/or risk for falling.)          [] Neuromuscular Re-education (Review/Progress HEP and provide verbal/tactile cueing for activities related to improving balance, coordination, kinesthetic sense, posture, motor skill, proprioception.)         [x] Manual Therapy (Provide manual therapy to mobilize soft tissue/joints for the purpose of modulating pain, promoting relaxation, increasing ROM, reducing/eliminating soft tissue swelling/inflammation/tightness, improving soft tissue extensibility)                [x] Modalities (For modulating pain/tenderness/paresthesias, reducing swelling/inflammation/tightness, improving soft tissue extensibility, and/or to increase muscle tone/strength):     [x] Ultrasound  [] Electrical Stimulation        [] Cervical Traction [] Lumbar Traction       [] Cold/hotpack [] Iontophoresis   Other:      []          []     Significant Findings At Last Visit/Comments:    · Current pain  2/10 with movement, 0/10 at rest.  Overall feels is 80% improved from IE.   Pain referral.    Physician Signature:________________________________Date:__________________  By signing above, therapists plan is approved by physician

## 2021-04-02 NOTE — FLOWSHEET NOTE
Physical Therapy Daily Treatment Note  Date:  2021    Patient Name:  Kelle Longoria    :  1970  MRN: [id number]  Restrictions/Precautions:   NA   Medical/Treatment Diagnosis Information:  · Diagnosis: Lateral epicondylitis of left elbow (M77.12)  · Treatment Diagnosis: L elbow pain  Insurance/Certification information:  PT Insurance Information: [facility], 30 visits per  year  Physician Information:  Referring Practitioner: SILVIA Hudson CNP, MD Follow-up Visit: ?  Plan of care signed (Y/N):  Y   Visit# / total visits:     Pain level: 2/10 with movement, 0/10 at rest    Progress Note Due (10 visits or 30 days, whichever is less):    Recertification Note Due (End of POC or 90 days, whichever is less):      Subjective:  2021: Pt reports began to have L elbow pain Oct/2020 without known cause. Pt states that it is improving but pain is still present. Pt notes that her pain ranges from 0/10 at best to 5/10 at worst.  Pain is worse with reaching across body or behind her back, if bumps elbow and with carrying/lifting. Pt did take Naproxen as prescribed and is now finished with prescription - seemed to help decrease pain as now is not as severe as it used to be. Pt reports pain is described as achy pain, and used to be sharp. Denies N/T. Pt is R hand dominant but does do some things with L hand. PLOF: No pain or limitations. 2021: Pt reports no complaints after  and Saturday felt okay. Pt reports cooked all day  and was sore all over, more pain in L elbow yesterday and last night used ice which helped and is better today. Is sore with movement of elbow especially full elbow extension today. 2021:  Pt reports that she has felt relief since last visit. Is now able to do stretch with elbow extended. 2021: Pt noted felt great after last visit.   However, can still feel some pain.        2021:  Overall less pain, does however feel some soreness going up into upper arm. Notes keeps bumping elbow which causes pain and sleeps with elbow bent which is also irritating. 03/23/2021: Pt reports she is no longer sleeping with elbow flexed or sleeping directly on the L elbow. Improvement in pain noted with this sleeping strategy. Pt is still bumping her elbow into objects when she is walking which causes pain. Pt reports her daughter owns a continuous fluid cryotherapy and she may use it for her elbow. 03/30/2021:  Pt notes that she has been more consistent in her HEP and feels that her arm is feeling better. Was able to reach behind her back easier a couple of days ago. 04/02/2021:  Overall feels is 80% improved from IE. Pain no more than 3/10 recently. If bumps her arm, is not as tender. Objective:03/05/2021   Observation:    Palpation: moderate tenderness along L lateral epicondyle, mild tenderness/tightness along wrist extensor muscle belly proximally     Test measurements:     AROM RUE (degrees)  RUE AROM : WFL  AROM LUE (degrees)  LUE AROM : WFL  LUE General AROM: with the exception of IR reach pt \"loosened arm up first\" and was effortful but = R.  Elbow WFLs, wrist WFLs with mild pain at endrange ext with elbow extended     Strength RLE  Comment: shoulder/elbow/wrist 5/5, R  on PAULETTE Lv 2: 70#, 65#, 65#  Strength LLE  Comment: shoulder flex/abd 4/5 each with pain, IR/ER 5/5 without pain, elbow 5/5, wrist ext 4+/5, flex 5/5. L  on PAULETTE Lv 2: 70#, 75#, 65#  Additional Measures  Special Tests: (-) Cozen and (-) Mill's test L  Other: UEFI = 30% impaired    04/02/2021: AROM L wrist ext with mild pain with elbow extended, shoulder IR reach [facility] without increased pain or effort. Strength L wrist ext 5/5 without pain, L shoulder flex 4+/5 with mild pain, abd 4+/5 no pain. Exercises, Neuro Facilitation & Gait Training (37306, [id number]):   Activity Resistance/Repetitions Other comments        Wrist ext strengthening With eccentric focus  Red x 10 L Verbal review   Shoulder flexion Red x 10 L Verbal review   L hand Squeezing Putty 30'' X 2 Verbal review - pt with good tolerance        Shoulder abd Red x 10 L                                         Therapeutic Activities (19954):     Home Exercise Program:   Pt. demonstrated good understanding and knowledge of HEP. Written instructions provided. 03/05/2021: wrist flex/ext stretches. Access Code: [email or web address] Patient Portal: [employer]/  03/16/2021: wrist ext t-band, shoulder flex t-band. Access Code [email or web address]  03/26/2021: Putty squeezes in L hand 30 seconds X 3. Manual Treatments (47503):  Gentle cross friction to L wrist ext muscle belly with pt resting L UE on pillow in sitting, good tolerance reported - still with mild tightness although is improving    Modalities: US L lateral epicondyle 8 mins, 3 MHz, 1.0 w/cm2, 20% with pt resting L UE on pillow in sitting, good tolerance reported. Pt declined CP as was feeling good after treatment today  Timed Code Treatment Minutes:  TE: 18, US: 8, MT: 12    Total Treatment Minutes:  38    Treatment/Activity Tolerance:  [x] Patient tolerated treatment well [] Patient limited by fatigue  [] Patient limited by pain  [] Patient limited by other medical complications  [] Other:     Assessment: Pt making good progress towards goals but still with mild lingering pain. Pt to benefit from further PT to return to PLOF.     Prognosis: [x] Good [] Fair  [] Poor    Patient Requires Follow-up: [x] Yes  [] No    Goals: assessed 04/02/2021  Short term goals  Time Frame for Short term goals: 2 weeks  Short term goal 1: Pt will demo good understanding and knowledge of initial HEP MET  Short term goal 2: Pt with only mild tenderness L lateral epicondyle MET  Long term goals  Time Frame for Long term goals : 4 weeks  Long term goal 1: Pt will demo good understanding and knowledge of HEP progressions ongoing  Long term goal 2: Decreased pain 1/10 at worst to allow for N functional mobility as previous partially met  Long term goal 3: AROM L shoulder IR reach WFLs and wrist ext WFLs with elbow extended each without pain to allow for N reaching tasks partially met  Long term goal 4: Improve strength with L shoulder flex/abd and L wrist ext 5/5 to allow for N carrying/lifting without increased pain partially met  Long term goal 5: Decreased impairment per UEFI <10% impaired not assessed    Plan:   Visits per week:  2  # of weeks:  4    [x] Continue per plan of care, additional visits requested [] Alter current plan (see comments)  [] Plan of care initiated [] Hold pending MD visit [] Discharge    Plan for Next Session: review HEP, add exercises/manual as tolerated     Electronically signed by:   Esther Colunga, DPT [id number]

## 2021-04-06 ENCOUNTER — HOSPITAL ENCOUNTER (OUTPATIENT)
Dept: PHYSICAL THERAPY | Age: 51
Setting detail: THERAPIES SERIES
Discharge: HOME | End: 2021-04-06
Payer: COMMERCIAL

## 2021-04-06 NOTE — CARE COORDINATION
Physical Therapy  Cancellation/No-show Note  Patient Name:  Aneesh Mcbride  :  1970   Date:  2021  MRN: [id number]  Cancelled visits to date: 2  2021  No-shows to date: 0    For today's appointment patient:  [x]  Cancelled  []  Rescheduled appointment  []  No-show     Reason given by patient:  []  Patient ill  []  Conflicting appointment  []  No transportation    []  Conflict with work   []  No reason given  [x]  Other:     Comments: overslept     Electronically signed by:   Esther Lovelace, DPT [id number]

## 2021-04-09 ENCOUNTER — HOSPITAL ENCOUNTER (OUTPATIENT)
Dept: PHYSICAL THERAPY | Age: 51
Setting detail: THERAPIES SERIES
Discharge: HOME | End: 2021-04-09
Payer: COMMERCIAL

## 2021-04-09 PROCEDURE — 97110 THERAPEUTIC EXERCISES: CPT

## 2021-04-09 PROCEDURE — 97140 MANUAL THERAPY 1/> REGIONS: CPT

## 2021-04-09 NOTE — FLOWSHEET NOTE
Physical Therapy Daily Treatment Note  Date:  2021    Patient Name:  Bisi Kulkarni    :  1970  MRN: [id number]  Restrictions/Precautions:   NA   Medical/Treatment Diagnosis Information:  · Diagnosis: Lateral epicondylitis of left elbow (M77.12)  · Treatment Diagnosis: L elbow pain  Insurance/Certification information:  PT Insurance Information: [facility], 30 visits per  year  Physician Information:  Referring Practitioner: SILVIA Dumont - CNP  MD Follow-up Visit: ?  Plan of care signed (Y/N):  Y   Visit# / total visits:   +   Pain level: 0/10 with movement, 0/10 at rest    Progress Note Due (10 visits or 30 days, whichever is less):    Recertification Note Due (End of POC or 90 days, whichever is less):      Subjective:  2021: Pt reports began to have L elbow pain Oct/2020 without known cause. Pt states that it is improving but pain is still present. Pt notes that her pain ranges from 0/10 at best to 5/10 at worst.  Pain is worse with reaching across body or behind her back, if bumps elbow and with carrying/lifting. Pt did take Naproxen as prescribed and is now finished with prescription - seemed to help decrease pain as now is not as severe as it used to be. Pt reports pain is described as achy pain, and used to be sharp. Denies N/T. Pt is R hand dominant but does do some things with L hand. PLOF: No pain or limitations. 2021: Pt reports no complaints after  and Saturday felt okay. Pt reports cooked all day  and was sore all over, more pain in L elbow yesterday and last night used ice which helped and is better today. Is sore with movement of elbow especially full elbow extension today. 2021:  Pt reports that she has felt relief since last visit. Is now able to do stretch with elbow extended. 2021: Pt noted felt great after last visit.   However, can still feel some pain.        2021:  Overall less pain, does however feel some soreness going up into upper arm. Notes keeps bumping elbow which causes pain and sleeps with elbow bent which is also irritating. 03/23/2021: Pt reports she is no longer sleeping with elbow flexed or sleeping directly on the L elbow. Improvement in pain noted with this sleeping strategy. Pt is still bumping her elbow into objects when she is walking which causes pain. Pt reports her daughter owns a continuous fluid cryotherapy and she may use it for her elbow. 03/30/2021:  Pt notes that she has been more consistent in her HEP and feels that her arm is feeling better. Was able to reach behind her back easier a couple of days ago. 04/02/2021:  Overall feels is 80% improved from IE. Pain no more than 3/10 recently. If bumps her arm, is not as tender. 04/09/2021: Pt notes hasn't had much pain since last seen with rating 1/10 at worst.                  Objective:03/05/2021   Observation:    Palpation: moderate tenderness along L lateral epicondyle, mild tenderness/tightness along wrist extensor muscle belly proximally     Test measurements:     AROM RUE (degrees)  RUE AROM : WFL  AROM LUE (degrees)  LUE AROM : WFL  LUE General AROM: with the exception of IR reach pt \"loosened arm up first\" and was effortful but = R.  Elbow WFLs, wrist WFLs with mild pain at endrange ext with elbow extended     Strength RLE  Comment: shoulder/elbow/wrist 5/5, R  on PAULETTE Lv 2: 70#, 65#, 65#  Strength LLE  Comment: shoulder flex/abd 4/5 each with pain, IR/ER 5/5 without pain, elbow 5/5, wrist ext 4+/5, flex 5/5. L  on PAULETTE Lv 2: 70#, 75#, 65#  Additional Measures  Special Tests: (-) Cozen and (-) Mill's test L  Other: UEFI = 30% impaired    04/02/2021: AROM L wrist ext with mild pain with elbow extended, shoulder IR reach [facility] without increased pain or effort. Strength L wrist ext 5/5 without pain, L shoulder flex 4+/5 with mild pain, abd 4+/5 no pain.       Exercises, met  Long term goal 3: AROM L shoulder IR reach WFLs and wrist ext WFLs with elbow extended each without pain to allow for N reaching tasks partially met  Long term goal 4: Improve strength with L shoulder flex/abd and L wrist ext 5/5 to allow for N carrying/lifting without increased pain partially met  Long term goal 5: Decreased impairment per UEFI <10% impaired not assessed    Plan:   Visits per week:  2  # of weeks:  4    [x] Continue per plan of care [] Alter current plan (see comments)  [] Plan of care initiated [] Hold pending MD visit [] Discharge    Plan for Next Session: review HEP, add exercises/manual as tolerated     Electronically signed by:   Esther Issa, DPT [id number]

## 2021-04-13 ENCOUNTER — HOSPITAL ENCOUNTER (OUTPATIENT)
Dept: PHYSICAL THERAPY | Age: 51
Setting detail: THERAPIES SERIES
Discharge: HOME | End: 2021-04-13
Payer: COMMERCIAL

## 2021-04-13 NOTE — CARE COORDINATION
Physical Therapy  Cancellation/No-show Note  Patient Name:  Pablo Light  :  1970   Date:  2021  MRN: [id number]  Cancelled visits to date: 3  2021  No-shows to date: 0    For today's appointment patient:  [x]  Cancelled  []  Rescheduled appointment  []  No-show     Reason given by patient:  []  Patient ill  []  Conflicting appointment  []  No transportation    []  Conflict with work   []  No reason given  [x]  Other:     Comments: leg is in a lot of pain    Electronically signed by:   Esther Issa, DPT [id number]

## 2021-04-16 ENCOUNTER — HOSPITAL ENCOUNTER (OUTPATIENT)
Dept: PHYSICAL THERAPY | Age: 51
Setting detail: THERAPIES SERIES
Discharge: HOME | End: 2021-04-16
Payer: COMMERCIAL

## 2021-04-16 NOTE — CARE COORDINATION
Physical Therapy  Cancellation/No-show Note  Patient Name:  Robert Duke  :  1970   Date:  2021  MRN: [id number]  Cancelled visits to date: 2021  No-shows to date: 0    For today's appointment patient:  [x]  Cancelled  []  Rescheduled appointment  []  No-show     Reason given by patient:  []  Patient ill  []  Conflicting appointment  []  No transportation    []  Conflict with work   []  No reason given  [x]  Other:     Comments: still in too much pain    Electronically signed by:   Rocío Hernandez, [address] [facility], DPT [id number]

## 2021-04-20 ENCOUNTER — HOSPITAL ENCOUNTER (OUTPATIENT)
Dept: PHYSICAL THERAPY | Age: 51
Setting detail: THERAPIES SERIES
Discharge: HOME | End: 2021-04-20
Payer: COMMERCIAL

## 2021-04-20 PROCEDURE — 97140 MANUAL THERAPY 1/> REGIONS: CPT

## 2021-04-20 PROCEDURE — 97110 THERAPEUTIC EXERCISES: CPT

## 2021-04-20 NOTE — FLOWSHEET NOTE
Physical Therapy Daily Treatment Note  Date:  2021    Patient Name:  Sher Salamanca    :  1970  MRN: [id number]  Restrictions/Precautions:   NA   Medical/Treatment Diagnosis Information:  · Diagnosis: Lateral epicondylitis of left elbow (M77.12)  · Treatment Diagnosis: L elbow pain  Insurance/Certification information:  PT Insurance Information: [facility], 30 visits per  year  Physician Information:  Referring Practitioner: SILVIA Villarreal CNP, MD Follow-up Visit: ?  Plan of care signed (Y/N):  Y   Visit# / total visits:   + 2/8  Pain level: 0/10 with movement, 0/10 at rest    Progress Note Due (10 visits or 30 days, whichever is less):    Recertification Note Due (End of POC or 90 days, whichever is less):      Subjective:  2021: Pt reports began to have L elbow pain Oct/2020 without known cause. Pt states that it is improving but pain is still present. Pt notes that her pain ranges from 0/10 at best to 5/10 at worst.  Pain is worse with reaching across body or behind her back, if bumps elbow and with carrying/lifting. Pt did take Naproxen as prescribed and is now finished with prescription - seemed to help decrease pain as now is not as severe as it used to be. Pt reports pain is described as achy pain, and used to be sharp. Denies N/T. Pt is R hand dominant but does do some things with L hand. PLOF: No pain or limitations. 2021: Pt reports no complaints after  and Saturday felt okay. Pt reports cooked all day  and was sore all over, more pain in L elbow yesterday and last night used ice which helped and is better today. Is sore with movement of elbow especially full elbow extension today. 2021:  Pt reports that she has felt relief since last visit. Is now able to do stretch with elbow extended. 2021: Pt noted felt great after last visit.   However, can still feel some pain.        2021:  Overall less pain, does however feel some soreness going up into upper arm. Notes keeps bumping elbow which causes pain and sleeps with elbow bent which is also irritating. 03/23/2021: Pt reports she is no longer sleeping with elbow flexed or sleeping directly on the L elbow. Improvement in pain noted with this sleeping strategy. Pt is still bumping her elbow into objects when she is walking which causes pain. Pt reports her daughter owns a continuous fluid cryotherapy and she may use it for her elbow. 03/30/2021:  Pt notes that she has been more consistent in her HEP and feels that her arm is feeling better. Was able to reach behind her back easier a couple of days ago. 04/02/2021:  Overall feels is 80% improved from IE. Pain no more than 3/10 recently. If bumps her arm, is not as tender. 04/09/2021: Pt notes hasn't had much pain since last seen with rating 1/10 at worst.         04/20/2021: If feels pain it is just on the L lateral epicondyle when has and has to move arm in different positions to feel pain. Pt feels is 90% of her \"normal.\"               Objective:03/05/2021   Observation:    Palpation: moderate tenderness along L lateral epicondyle, mild tenderness/tightness along wrist extensor muscle belly proximally     Test measurements:     AROM RUE (degrees)  RUE AROM : WFL  AROM LUE (degrees)  LUE AROM : WFL  LUE General AROM: with the exception of IR reach pt \"loosened arm up first\" and was effortful but = R.  Elbow WFLs, wrist WFLs with mild pain at endrange ext with elbow extended     Strength RLE  Comment: shoulder/elbow/wrist 5/5, R  on PAULETTE Lv 2: 70#, 65#, 65#  Strength LLE  Comment: shoulder flex/abd 4/5 each with pain, IR/ER 5/5 without pain, elbow 5/5, wrist ext 4+/5, flex 5/5.   L  on PAULETTE Lv 2: 70#, 75#, 65#  Additional Measures  Special Tests: (-) Cozen and (-) Mill's test L  Other: UEFI = 30% impaired    04/02/2021: AROM L wrist ext with mild pain with elbow extended, shoulder IR reach [facility] without increased pain or effort. Strength L wrist ext 5/5 without pain, L shoulder flex 4+/5 with mild pain, abd 4+/5 no pain. Exercises, Neuro Facilitation & Gait Training (67460, [id number]): Activity Resistance/Repetitions Other comments   Wrist flex/ext stretch with elbow extended 15\" x 3 each L Less stretch with each        Wrist ext strengthening With eccentric focus  Red x 10 L    Shoulder flexion Red x 10 L        Shoulder abd Red x 10 L Can feel arm \"working\"                                        Therapeutic Activities (41776):     Home Exercise Program:   Pt. demonstrated good understanding and knowledge of HEP. Written instructions provided. 03/05/2021: wrist flex/ext stretches. Access Code: [email or web address] Patient Portal: [email or web address]/  03/16/2021: wrist ext t-band, shoulder flex t-band. Access Code [email or web address]  03/26/2021: Putty squeezes in L hand 30 seconds X 3. Manual Treatments (23270):  Gentle cross friction to L wrist ext muscle belly with pt resting L UE on pillow in sitting, good tolerance reported     Modalities: US L lateral epicondyle 8 mins, 3 MHz, 1.0 w/cm2, 20% with pt resting L UE on pillow in sitting, good tolerance reported. Timed Code Treatment Minutes:  TE: 12, US: 8, MT: 10    Total Treatment Minutes:  30    Treatment/Activity Tolerance:  [x] Patient tolerated treatment well [] Patient limited by fatigue  [] Patient limited by pain  [] Patient limited by other medical complications  [] Other:     Assessment: Overall less pain reported. Pt to benefit from further PT to return to PLOF.     Prognosis: [x] Good [] Fair  [] Poor    Patient Requires Follow-up: [x] Yes  [] No    Goals: assessed 04/02/2021  Short term goals  Time Frame for Short term goals: 2 weeks  Short term goal 1: Pt will demo good understanding and knowledge of initial HEP MET  Short term goal 2: Pt with only mild tenderness L lateral epicondyle MET  Long term

## 2021-04-23 ENCOUNTER — HOSPITAL ENCOUNTER (OUTPATIENT)
Dept: PHYSICAL THERAPY | Age: 51
Setting detail: THERAPIES SERIES
Discharge: HOME | End: 2021-04-23
Payer: COMMERCIAL

## 2021-04-23 PROCEDURE — 97140 MANUAL THERAPY 1/> REGIONS: CPT

## 2021-04-23 PROCEDURE — 97110 THERAPEUTIC EXERCISES: CPT

## 2021-04-23 NOTE — FLOWSHEET NOTE
Physical Therapy Daily Treatment Note  Date:  2021    Patient Name:  Alexa Jasso    :  1970  MRN: [id number]  Restrictions/Precautions:   NA   Medical/Treatment Diagnosis Information:  · Diagnosis: Lateral epicondylitis of left elbow (M77.12)  · Treatment Diagnosis: L elbow pain  Insurance/Certification information:  PT Insurance Information: [facility], 30 visits per  year  Physician Information:  Referring Practitioner: SILVIA Calderon CNP, MD Follow-up Visit: ?  Plan of care signed (Y/N):  Y   Visit# / total visits:  8/8 + 3/8  Pain level: 0/10 with movement, 0/10 at rest    Progress Note Due (10 visits or 30 days, whichever is less):    Recertification Note Due (End of POC or 90 days, whichever is less):      Subjective:  2021: Pt reports began to have L elbow pain Oct/2020 without known cause. Pt states that it is improving but pain is still present. Pt notes that her pain ranges from 0/10 at best to 5/10 at worst.  Pain is worse with reaching across body or behind her back, if bumps elbow and with carrying/lifting. Pt did take Naproxen as prescribed and is now finished with prescription - seemed to help decrease pain as now is not as severe as it used to be. Pt reports pain is described as achy pain, and used to be sharp. Denies N/T. Pt is R hand dominant but does do some things with L hand. PLOF: No pain or limitations. 2021: Pt reports no complaints after  and Saturday felt okay. Pt reports cooked all day  and was sore all over, more pain in L elbow yesterday and last night used ice which helped and is better today. Is sore with movement of elbow especially full elbow extension today. 2021:  Pt reports that she has felt relief since last visit. Is now able to do stretch with elbow extended. 2021: Pt noted felt great after last visit.   However, can still feel some pain.        2021:  Overall less pain, does however feel some soreness going up into upper arm. Notes keeps bumping elbow which causes pain and sleeps with elbow bent which is also irritating. 03/23/2021: Pt reports she is no longer sleeping with elbow flexed or sleeping directly on the L elbow. Improvement in pain noted with this sleeping strategy. Pt is still bumping her elbow into objects when she is walking which causes pain. Pt reports her daughter owns a continuous fluid cryotherapy and she may use it for her elbow. 03/30/2021:  Pt notes that she has been more consistent in her HEP and feels that her arm is feeling better. Was able to reach behind her back easier a couple of days ago. 04/02/2021:  Overall feels is 80% improved from IE. Pain no more than 3/10 recently. If bumps her arm, is not as tender. 04/09/2021: Pt notes hasn't had much pain since last seen with rating 1/10 at worst.         04/20/2021: If feels pain it is just on the L lateral epicondyle when has and has to move arm in different positions to feel pain. Pt feels is 90% of her \"normal.\"       04/23/2021:  Pt notes was sore after last visit 3.5-4/10 \"achy\" with use and lasted for a couple of days. Objective:03/05/2021   Observation:    Palpation: moderate tenderness along L lateral epicondyle, mild tenderness/tightness along wrist extensor muscle belly proximally     Test measurements:     AROM RUE (degrees)  RUE AROM : WFL  AROM LUE (degrees)  LUE AROM : WFL  LUE General AROM: with the exception of IR reach pt \"loosened arm up first\" and was effortful but = R.  Elbow WFLs, wrist WFLs with mild pain at endrange ext with elbow extended     Strength RLE  Comment: shoulder/elbow/wrist 5/5, R  on PAULETTE Lv 2: 70#, 65#, 65#  Strength LLE  Comment: shoulder flex/abd 4/5 each with pain, IR/ER 5/5 without pain, elbow 5/5, wrist ext 4+/5, flex 5/5.   L  on PAULETTE Lv 2: 70#, 75#, 65#  Additional Measures  Special Tests: (-) Cozen and (-) Mill's test L  Other: UEFI = 30% impaired    04/02/2021: AROM L wrist ext with mild pain with elbow extended, shoulder IR reach [facility] without increased pain or effort. Strength L wrist ext 5/5 without pain, L shoulder flex 4+/5 with mild pain, abd 4+/5 no pain. Exercises, Neuro Facilitation & Gait Training (92225, [id number]): Activity Resistance/Repetitions Other comments   Wrist flex/ext stretch with elbow extended 15\" x 3 each L Less stretch with each        Wrist ext strengthening With eccentric focus  Red x 10 L    Shoulder flexion Red x 10 L        Shoulder abd Red x 10 L Can feel arm \"working\"                                        Therapeutic Activities (26454):     Home Exercise Program:   Pt. demonstrated good understanding and knowledge of HEP. Written instructions provided. 03/05/2021: wrist flex/ext stretches. Access Code: [email or web address] Patient Portal: [email or web address]/  03/16/2021: wrist ext t-band, shoulder flex t-band. Access Code [email or web address]  03/26/2021: Putty squeezes in L hand 30 seconds X 3. Manual Treatments (19964):  Gentle cross friction to L wrist ext muscle belly with pt resting L UE on pillow in sitting, good tolerance reported     Modalities: US L lateral epicondyle 8 mins, 3 MHz, 1.0 w/cm2, 20% with pt resting L UE on pillow in sitting, good tolerance reported. Timed Code Treatment Minutes:  TE: 12, US: 8, MT: 10    Total Treatment Minutes:  30    Treatment/Activity Tolerance:  [x] Patient tolerated treatment well [] Patient limited by fatigue  [] Patient limited by pain  [] Patient limited by other medical complications  [] Other:     Assessment: Good tolerance to PT today without increased pain reported despite flare-up after last visit. Pt to benefit from further PT to return to PLOF.     Prognosis: [x] Good [] Fair  [] Poor    Patient Requires Follow-up: [x] Yes  [] No    Goals: assessed 04/02/2021  Short term goals  Time Frame for Short term goals: 2 weeks  Short term goal 1: Pt will demo good understanding and knowledge of initial HEP MET  Short term goal 2: Pt with only mild tenderness L lateral epicondyle MET  Long term goals  Time Frame for Long term goals : 4 weeks  Long term goal 1: Pt will demo good understanding and knowledge of HEP progressions ongoing  Long term goal 2: Decreased pain 1/10 at worst to allow for N functional mobility as previous partially met  Long term goal 3: AROM L shoulder IR reach WFLs and wrist ext WFLs with elbow extended each without pain to allow for N reaching tasks partially met  Long term goal 4: Improve strength with L shoulder flex/abd and L wrist ext 5/5 to allow for N carrying/lifting without increased pain partially met  Long term goal 5: Decreased impairment per UEFI <10% impaired not assessed    Plan:   Visits per week:  2  # of weeks:  4    [x] Continue per plan of care [] Alter current plan (see comments)  [] Plan of care initiated [] Hold pending MD visit [] Discharge    Plan for Next Session: review HEP, add exercises/manual as tolerated     Electronically signed by:   Meghan Zamudio, [address] [facility], DPT [id number]

## 2021-04-30 ENCOUNTER — HOSPITAL ENCOUNTER (OUTPATIENT)
Dept: PHYSICAL THERAPY | Age: 51
Setting detail: THERAPIES SERIES
Discharge: HOME | End: 2021-04-30
Payer: COMMERCIAL

## 2021-04-30 NOTE — CARE COORDINATION
Physical Therapy  Cancellation/No-show Note  Patient Name:  Yolanda Mancini  :  1970   Date:  2021  MRN: [id number]  Cancelled visits to date: 2021  No-shows to date: 2021    For today's appointment patient:  []  Cancelled  []  Rescheduled appointment  [x]  No-show     Reason given by patient:  []  Patient ill  []  Conflicting appointment  []  No transportation    []  Conflict with work   []  No reason given  []  Other:     Comments:     Electronically signed by:   Skip Bradford, [address] [facility], DPT [id number]

## 2021-05-06 ENCOUNTER — OFFICE VISIT (OUTPATIENT)
Dept: INTERNAL MEDICINE CLINIC | Age: 51
End: 2021-05-06
Payer: COMMERCIAL

## 2021-05-06 VITALS
OXYGEN SATURATION: 97 % | DIASTOLIC BLOOD PRESSURE: 74 MMHG | SYSTOLIC BLOOD PRESSURE: 134 MMHG | HEIGHT: 62 IN | HEART RATE: 78 BPM | BODY MASS INDEX: 39.01 KG/M2 | WEIGHT: 212 LBS

## 2021-05-06 DIAGNOSIS — M76.891 HAMSTRING TENDINITIS OF RIGHT THIGH: ICD-10-CM

## 2021-05-06 PROCEDURE — G8427 DOCREV CUR MEDS BY ELIG CLIN: HCPCS | Performed by: NURSE PRACTITIONER

## 2021-05-06 PROCEDURE — 1036F TOBACCO NON-USER: CPT | Performed by: NURSE PRACTITIONER

## 2021-05-06 PROCEDURE — G8417 CALC BMI ABV UP PARAM F/U: HCPCS | Performed by: NURSE PRACTITIONER

## 2021-05-06 PROCEDURE — 99214 OFFICE O/P EST MOD 30 MIN: CPT | Performed by: NURSE PRACTITIONER

## 2021-05-06 PROCEDURE — 3017F COLORECTAL CA SCREEN DOC REV: CPT | Performed by: NURSE PRACTITIONER

## 2021-05-06 RX ORDER — PREDNISONE 20 MG/1
20 TABLET ORAL 2 TIMES DAILY
Qty: 10 TABLET | Refills: 0 | Status: SHIPPED | OUTPATIENT
Start: 2021-05-06 | End: 2021-05-13 | Stop reason: SDUPTHER

## 2021-05-06 ASSESSMENT — ENCOUNTER SYMPTOMS
COLOR CHANGE: 0
EYE ITCHING: 0
WHEEZING: 0
SINUS PRESSURE: 0
BACK PAIN: 0
RHINORRHEA: 0
DIARRHEA: 0
VOMITING: 0
SORE THROAT: 0
NAUSEA: 0
BLOOD IN STOOL: 0
EYE REDNESS: 0
SHORTNESS OF BREATH: 0
ABDOMINAL PAIN: 0
COUGH: 0
CONSTIPATION: 0
CHEST TIGHTNESS: 0

## 2021-05-06 ASSESSMENT — PATIENT HEALTH QUESTIONNAIRE - PHQ9
DEPRESSION UNABLE TO ASSESS: FUNCTIONAL CAPACITY MOTIVATION LIMITS ACCURACY
2. FEELING DOWN, DEPRESSED OR HOPELESS: 0
SUM OF ALL RESPONSES TO PHQ QUESTIONS 1-9: 0

## 2021-05-06 NOTE — PROGRESS NOTES
Alexa Jasso (:  1970) is a 44 y.o. female,Established patient, here for evaluation of the following chief complaint(s):  Leg Pain and Foot Pain      ASSESSMENT/PLAN:  1. Hamstring tendinitis of right thigh  Assessment & Plan:   RICE  NSAIDS  Short course of prednisone       No follow-ups on file. SUBJECTIVE/OBJECTIVE:  HPI  Patient is in the office today with pain in the right side of her hamstring. She states that the pain has been going on for the past 3 weeks. She has seen her chiropractor without any improvement. She states that she has been taking ibuprofen with some improvement. She denies any injury. Current Outpatient Medications   Medication Sig Dispense Refill    predniSONE (DELTASONE) 20 MG tablet Take 1 tablet by mouth 2 times daily for 5 days 10 tablet 0    ibuprofen (ADVIL;MOTRIN) 800 MG tablet Take 1 tablet by mouth every 8 hours as needed for Pain 30 tablet 0    naproxen (NAPROSYN) 250 MG tablet Take 1 tablet by mouth 2 times daily as needed for Pain 60 tablet 0    nystatin (MYCOSTATIN) 383657 UNIT/GM powder Apply 3 times daily. 45 g 0    nystatin (MYCOSTATIN) 945455 UNIT/GM cream Apply topically 2 times daily. 30 g 1    hydroquinone 4 % cream Apply topically 2 times daily. 30 g 2    naproxen (NAPROSYN) 500 MG tablet Take 1 tablet by mouth 2 times daily (with meals) 30 tablet 0    terbinafine (ATHLETES FOOT) 1 % cream Apply topically 2 times daily. 15 g 0    Cholecalciferol (VITAMIN D3) 25 MCG (1000 UT) TABS Take 2 tablets by mouth daily 180 tablet 0    naproxen (NAPROSYN) 500 MG tablet Take 1 tablet by mouth 2 times daily for 10 days 20 tablet 0     No current facility-administered medications for this visit. Review of Systems   Constitutional: Negative for chills, fatigue and fever. HENT: Negative for congestion, ear pain, postnasal drip, rhinorrhea, sinus pressure, sneezing and sore throat. Eyes: Negative for redness and itching.    Respiratory: Negative for cough, chest tightness, shortness of breath and wheezing. Cardiovascular: Negative for chest pain and palpitations. Gastrointestinal: Negative for abdominal pain, blood in stool, constipation, diarrhea, nausea and vomiting. Endocrine: Negative for cold intolerance and heat intolerance. Genitourinary: Negative for difficulty urinating, dysuria, flank pain, frequency, hematuria and urgency. Musculoskeletal: Positive for myalgias (right hamstring). Negative for arthralgias, back pain and joint swelling. Skin: Negative for color change, pallor, rash and wound. Allergic/Immunologic: Negative for environmental allergies and food allergies. Neurological: Negative for dizziness, seizures, syncope, weakness, light-headedness, numbness and headaches. Hematological: Negative for adenopathy. Does not bruise/bleed easily. Psychiatric/Behavioral: Negative for confusion, sleep disturbance and suicidal ideas. The patient is not nervous/anxious and is not hyperactive. Vitals:    05/06/21 1026   BP: 134/74   Site: Left Upper Arm   Position: Sitting   Cuff Size: Large Adult   Pulse: 78   SpO2: 97%   Weight: 212 lb (96.2 kg)   Height: 5' 2\" (1.575 m)       Physical Exam  Constitutional:       Appearance: Normal appearance. She is well-developed. HENT:      Head: Normocephalic and atraumatic. Right Ear: Hearing normal.      Left Ear: Hearing normal.      Nose: No mucosal edema. Right Sinus: No maxillary sinus tenderness or frontal sinus tenderness. Left Sinus: No maxillary sinus tenderness or frontal sinus tenderness. Mouth/Throat: Tonsils: No tonsillar abscesses. Eyes:      Extraocular Movements: Extraocular movements intact. Pupils: Pupils are equal, round, and reactive to light. Cardiovascular:      Rate and Rhythm: Normal rate and regular rhythm. Pulses: Normal pulses. Heart sounds: Normal heart sounds.    Pulmonary:      Effort: Pulmonary effort is normal. Breath sounds: Normal breath sounds. Musculoskeletal:        Legs:    Lymphadenopathy:      Head:      Right side of head: No submental, submandibular, tonsillar, preauricular, posterior auricular or occipital adenopathy. Left side of head: No submental, submandibular, tonsillar, preauricular, posterior auricular or occipital adenopathy. Skin:     General: Skin is warm and dry. Neurological:      Mental Status: She is alert. Psychiatric:         Mood and Affect: Mood normal.         Behavior: Behavior normal.           On this date 5/6/2021 I have spent 30 minutes reviewing previous notes, test results and face to face with the patient discussing the diagnosis and importance of compliance with the treatment plan as well as documenting on the day of the visit. An electronic signature was used to authenticate this note.     --SILVIA Angela - CNP

## 2021-05-07 ENCOUNTER — TELEPHONE (OUTPATIENT)
Dept: INTERNAL MEDICINE CLINIC | Age: 51
End: 2021-05-07

## 2021-05-11 ENCOUNTER — HOSPITAL ENCOUNTER (OUTPATIENT)
Dept: PHYSICAL THERAPY | Age: 51
Setting detail: THERAPIES SERIES
Discharge: HOME | End: 2021-05-11
Payer: COMMERCIAL

## 2021-05-11 PROCEDURE — 97530 THERAPEUTIC ACTIVITIES: CPT

## 2021-05-11 PROCEDURE — 97110 THERAPEUTIC EXERCISES: CPT

## 2021-05-11 NOTE — FLOWSHEET NOTE
Physical Therapy Daily Treatment Note  Date:  2021    Patient Name:  Shanta tSark    :  1970  MRN: [id number]  Restrictions/Precautions:   NA   Medical/Treatment Diagnosis Information:  · Diagnosis: Lateral epicondylitis of left elbow (M77.12)  · Treatment Diagnosis: L elbow pain  Insurance/Certification information:  PT Insurance Information: [facility], 30 visits per  year  Physician Information:  Referring Practitioner: SILVIA Teixeira CNP, MD Follow-up Visit: ?  Plan of care signed (Y/N):  Y   Visit# / total visits:   + 48  Pain level: 0/10 with movement, 0/10 at rest    Progress Note Due (10 visits or 30 days, whichever is less):    Recertification Note Due (End of POC or 90 days, whichever is less):      Subjective:  2021: Pt reports began to have L elbow pain Oct/2020 without known cause. Pt states that it is improving but pain is still present. Pt notes that her pain ranges from 0/10 at best to 5/10 at worst.  Pain is worse with reaching across body or behind her back, if bumps elbow and with carrying/lifting. Pt did take Naproxen as prescribed and is now finished with prescription - seemed to help decrease pain as now is not as severe as it used to be. Pt reports pain is described as achy pain, and used to be sharp. Denies N/T. Pt is R hand dominant but does do some things with L hand. PLOF: No pain or limitations. 2021: Pt reports no complaints after  and Saturday felt okay. Pt reports cooked all day  and was sore all over, more pain in L elbow yesterday and last night used ice which helped and is better today. Is sore with movement of elbow especially full elbow extension today. 2021:  Pt reports that she has felt relief since last visit. Is now able to do stretch with elbow extended. 2021: Pt noted felt great after last visit.   However, can still feel some pain.        2021:  Overall less pain, does however feel some soreness going up into upper arm. Notes keeps bumping elbow which causes pain and sleeps with elbow bent which is also irritating. 03/23/2021: Pt reports she is no longer sleeping with elbow flexed or sleeping directly on the L elbow. Improvement in pain noted with this sleeping strategy. Pt is still bumping her elbow into objects when she is walking which causes pain. Pt reports her daughter owns a continuous fluid cryotherapy and she may use it for her elbow. 03/30/2021:  Pt notes that she has been more consistent in her HEP and feels that her arm is feeling better. Was able to reach behind her back easier a couple of days ago. 04/02/2021:  Overall feels is 80% improved from IE. Pain no more than 3/10 recently. If bumps her arm, is not as tender. 04/09/2021: Pt notes hasn't had much pain since last seen with rating 1/10 at worst.         04/20/2021: If feels pain it is just on the L lateral epicondyle when has and has to move arm in different positions to feel pain. Pt feels is 90% of her \"normal.\"       04/23/2021:  Pt notes was sore after last visit 3.5-4/10 \"achy\" with use and lasted for a couple of days. 05/11/2021:  Pain at worst recently 3/10. Overall is 97-98% improved since IE. Objective:03/05/2021   Observation:    Palpation: moderate tenderness along L lateral epicondyle, mild tenderness/tightness along wrist extensor muscle belly proximally     Test measurements:     AROM RUE (degrees)  RUE AROM : WFL  AROM LUE (degrees)  LUE AROM : WFL  LUE General AROM: with the exception of IR reach pt \"loosened arm up first\" and was effortful but = R.  Elbow WFLs, wrist WFLs with mild pain at endrange ext with elbow extended     Strength RLE  Comment: shoulder/elbow/wrist 5/5, R  on PAULETTE Lv 2: 70#, 65#, 65#  Strength LLE  Comment: shoulder flex/abd 4/5 each with pain, IR/ER 5/5 without pain, elbow 5/5, wrist ext 4+/5, flex 5/5.   L  on PAULETTE Lv 2: 70#, 75#, 65#  Additional Measures  Special Tests: (-) Cozen and (-) Mill's test L  Other: UEFI = 30% impaired    04/02/2021: AROM L wrist ext with mild pain with elbow extended, shoulder IR reach [facility] without increased pain or effort. Strength L wrist ext 5/5 without pain, L shoulder flex 4+/5 with mild pain, abd 4+/5 no pain. 05/11/2021: AROM L wrist WFLs without pain with elbow extended and shoulder IR reach WFLs and without pain. Strength L wrist ext 5/5, L shoulder flex/abd 5/5 each without pain. UEFI = 74/80 = 7.5% impaired      Exercises, Neuro Facilitation & Gait Training (18298, [id number], [id number]): Activity Resistance/Repetitions Other comments                                        Therapeutic Activities (67967): Pt brought in lateral epicondylitis strap she had purchased for instruction on positioning/wear. Demo to pt proper position to wear and when to wear and pt verbalized good understanding. Home Exercise Program:   Pt. demonstrated good understanding and knowledge of HEP. Written instructions provided. 03/05/2021: wrist flex/ext stretches. Access Code: [email or web address] Patient Portal: [employer]. [employer]/  03/16/2021: wrist ext t-band, shoulder flex t-band. Access Code [email or web address]  03/26/2021: Putty squeezes in L hand 30 seconds X 3. Manual Treatments (73393):  Gentle cross friction to L wrist ext muscle belly with pt resting L UE on pillow in sitting, good tolerance reported     Modalities: US L lateral epicondyle 8 mins, 3 MHz, 1.0 w/cm2, 20% with pt resting L UE on pillow in sitting, good tolerance reported.     Timed Code Treatment Minutes:  TE: 11, US: 8, MT: 8, TA: 8    Total Treatment Minutes:  35    Treatment/Activity Tolerance:  [x] Patient tolerated treatment well [] Patient limited by fatigue  [] Patient limited by pain  [] Patient limited by other medical complications  [] Other:     Assessment: see goal assessment below    Prognosis: [x] Good [] Fair  [] Poor    Patient Requires Follow-up: [x] Yes  [] No    Goals: assessed 05/11/2021  Short term goals  Time Frame for Short term goals: 2 weeks  Short term goal 1: Pt will demo good understanding and knowledge of initial HEP MET  Short term goal 2: Pt with only mild tenderness L lateral epicondyle MET  Long term goals  Time Frame for Long term goals : 4 weeks  Long term goal 1: Pt will demo good understanding and knowledge of HEP progressions MET  Long term goal 2: Decreased pain 1/10 at worst to allow for N functional mobility as previous nearly met  Long term goal 3: AROM L shoulder IR reach WFLs and wrist ext WFLs with elbow extended each without pain to allow for N reaching tasks MET  Long term goal 4: Improve strength with L shoulder flex/abd and L wrist ext 5/5 to allow for N carrying/lifting without increased pain MET  Long term goal 5: Decreased impairment per UEFI <10% impaired MET    Plan:   Visits per week:  2  # of weeks:  4    [] Continue per plan of care [] Alter current plan (see comments)  [] Plan of care initiated [x] Hold x 30 days per pt request [] Discharge    Plan for Next Session: review HEP, add exercises/manual as tolerated     Electronically signed by:   Susan Almaguer, [address] [facility], DPT [id number]

## 2021-05-11 NOTE — PROGRESS NOTES
The Upper Extremity Functional Index    Patient: Moni Dewitt  : 1970  MRN: [id number]  Date: 2021  Electronically Signed by: Tammy Kaufman PT, DPT [id number]     We are interested in knowing whether you are having any difficulty at all with the activities listed below because of your upper limb problem for which you are currently seeking attention. Please provide an answer for each activity.          Today do you or would you have difficulty at all with:    Activities Extreme Difficulty or Unable to Perform Activity Quite a Bit of Difficulty Moderate Difficulty A Little Bit of Difficulty No Difficulty   1 Any of your usual work, housework, or school activities []0 []1 []2  []3 [x]4   2 Your usual hobbies, recreational, or sporting activities []0 []1 []2 [x]3 []4   3 Lifting a bag of groceries to waist level []0 []1 []2 []3 [x]4   4 Lifting a bag of groceries above your head []0 []1 []2 [x]3 []4   5 Grooming your hair []0 []1 []2 []3 [x]4   6 Pushing up on your hands (eg from bathtub/chair) []0 []1 []2 []3 [x]4   7 Preparing food (eg peeling, cutting) []0 []1 []2 []3 [x]4   8 Driving []0 []1 []2 []3 [x]4   9 Vacuuming, sweeping, or raking []0 []1 []2 [x]3 []4   10 Dressing []0 []1 []2 []3 [x]4   11 Doing up buttons []0 []1 []2 []3 [x]4   12 Using tools or appliances []0 []1 []2 []3 [x]4   13 Opening doors []0 []1 []2 []3 [x]4   14 Cleaning []0 []1 []2 []3 [x]4   15 Tying or lacing shoes []0 []1 []2 []3 [x]4   16 Sleeping []0 []1 []2 []3 [x]4   17 Laundering clothes (eg washing, ironing, folding)  []0 []1 []2 [x]3 []4   18 Opening a jar []0 []1 []2 []3 [x]4   19 Throwing a ball []0 []1 []2 [x]3 []4   20 Carrying a small suitcase with your affected limb []0 []1 []2 [x]3 []4    Column Totals:          Patient Score from Above: 74/80    (Patient Score / 80) X 100:  92.5%      Scoring Method for UEFI    Scoring:   UEFI Score = (Sum of Responses / 80) X 100    Error + / - 5 points, Minimum Level of
97-98% improved since IE. AROM L wrist WFLs without pain with elbow extended and shoulder IR reach WFLs and without pain. Strength L wrist ext 5/5, L shoulder flex/abd 5/5 each without pain. UEFI = 74/80 = 7.5% impaired           Progress towards goals:    Short term goals  Time Frame for Short term goals: 2 weeks  Short term goal 1: Pt will demo good understanding and knowledge of initial HEP MET  Short term goal 2: Pt with only mild tenderness L lateral epicondyle MET  Long term goals  Time Frame for Long term goals : 4 weeks  Long term goal 1: Pt will demo good understanding and knowledge of HEP progressions MET  Long term goal 2: Decreased pain 1/10 at worst to allow for N functional mobility as previous nearly met  Long term goal 3: AROM L shoulder IR reach WFLs and wrist ext WFLs with elbow extended each without pain to allow for N reaching tasks MET  Long term goal 4: Improve strength with L shoulder flex/abd and L wrist ext 5/5 to allow for N carrying/lifting without increased pain MET  Long term goal 5: Decreased impairment per UEFI <10% impaired MET    Frequency/Duration:  # Days per week: [] 1 day # Weeks: [] 1 week [x] 4 weeks      [x] 2 days   [] 2 weeks [] 5 weeks      [] 3 days   [] 3 weeks [] 6 weeks     Rehab Potential: [] Excellent [x] Good [] Fair  [] Poor     Goal Status:  [] Achieved [x] Partially Achieved  [] Not Achieved     Patient Status: [] Continue per initial plan of Care     [x] Patient now on hold. Pt has made great progress towards goals and will hold chart x 30 days per pt request.  Recommend pt continue with HEP as instructed and call to schedule additional visit if has increased complaints. [x] Additional visits requested, Please re-certify for additional visits:        Requested frequency/duration:   X/week for  weeks    Electronically signed by: Pillo Murray PT, DPT [id number]    If you have any questions or concerns, please don't hesitate to call.   Thank you for your

## 2021-05-13 ENCOUNTER — TELEPHONE (OUTPATIENT)
Dept: INTERNAL MEDICINE CLINIC | Age: 51
End: 2021-05-13

## 2021-05-13 RX ORDER — PREDNISONE 20 MG/1
20 TABLET ORAL 2 TIMES DAILY
Qty: 10 TABLET | Refills: 0 | Status: SHIPPED | OUTPATIENT
Start: 2021-05-13 | End: 2021-05-18

## 2021-05-14 ENCOUNTER — APPOINTMENT (OUTPATIENT)
Dept: PHYSICAL THERAPY | Age: 51
End: 2021-05-14
Payer: COMMERCIAL

## 2021-05-14 ENCOUNTER — NURSE TRIAGE (OUTPATIENT)
Dept: OTHER | Facility: CLINIC | Age: 51
End: 2021-05-14

## 2021-05-14 ENCOUNTER — OFFICE VISIT (OUTPATIENT)
Dept: INTERNAL MEDICINE CLINIC | Age: 51
End: 2021-05-14
Payer: COMMERCIAL

## 2021-05-14 VITALS
WEIGHT: 215 LBS | DIASTOLIC BLOOD PRESSURE: 82 MMHG | HEIGHT: 62 IN | SYSTOLIC BLOOD PRESSURE: 136 MMHG | BODY MASS INDEX: 39.56 KG/M2

## 2021-05-14 DIAGNOSIS — G57.93 NEUROPATHY INVOLVING BOTH LOWER EXTREMITIES: Primary | ICD-10-CM

## 2021-05-14 DIAGNOSIS — G57.91 NEUROPATHY OF RIGHT LOWER EXTREMITY: ICD-10-CM

## 2021-05-14 PROCEDURE — G8427 DOCREV CUR MEDS BY ELIG CLIN: HCPCS | Performed by: NURSE PRACTITIONER

## 2021-05-14 PROCEDURE — G8417 CALC BMI ABV UP PARAM F/U: HCPCS | Performed by: NURSE PRACTITIONER

## 2021-05-14 PROCEDURE — 3017F COLORECTAL CA SCREEN DOC REV: CPT | Performed by: NURSE PRACTITIONER

## 2021-05-14 PROCEDURE — 1036F TOBACCO NON-USER: CPT | Performed by: NURSE PRACTITIONER

## 2021-05-14 PROCEDURE — 99214 OFFICE O/P EST MOD 30 MIN: CPT | Performed by: NURSE PRACTITIONER

## 2021-05-14 NOTE — TELEPHONE ENCOUNTER
Received call from Martin Chamberlain at [facility]) Naina with Red Flag Complaint. Brief description of triage: See below    Triage indicates for patient to see PCP today or tomorrow in the office. Advised walk-in clinic or [address] if no available appts. Advised patient that a RX for Prednisone was called in for her yesterday as she was unaware; pt v/u. Care advice provided, patient verbalizes understanding; denies any other questions or concerns; instructed to call back for any new or worsening symptoms. Writer provided warm transfer to [address] [place] [address] at [facility] for appointment scheduling. Attention Provider: Thank you for allowing me to participate in the care of your patient. The patient was connected to triage in response to information provided to the [facility]. Please do not respond through this encounter as the response is not directed to a shared pool. Reason for Disposition   Numbness in a leg or foot (i.e., loss of sensation)    Answer Assessment - Initial Assessment Questions  1. ONSET: \"When did the pain start? \"       Patient was seen in the office for this pain on May 6th and reports pain is unchanged. 2. LOCATION: \"Where is the pain located? \"       R anterior thigh    3. PAIN: \"How bad is the pain? \"    (Scale 1-10; or mild, moderate, severe)    -  MILD (1-3): doesn't interfere with normal activities     -  MODERATE (4-7): interferes with normal activities (e.g., work or school) or awakens from sleep, limping     -  SEVERE (8-10): excruciating pain, unable to do any normal activities, unable to walk      6/10    4. WORK OR EXERCISE: \"Has there been any recent work or exercise that involved this part of the body? \"       Denies    5. CAUSE: \"What do you think is causing the leg pain? \"      Unsure    6. OTHER SYMPTOMS: \"Do you have any other symptoms? \" (e.g., chest pain, back pain, breathing difficulty, swelling, rash, fever, numbness, weakness)      Numbness to R thigh since

## 2021-05-17 PROBLEM — G57.91 NEUROPATHY OF RIGHT LOWER EXTREMITY: Status: ACTIVE | Noted: 2021-05-17

## 2021-05-17 ASSESSMENT — ENCOUNTER SYMPTOMS
CHEST TIGHTNESS: 0
SORE THROAT: 0
COLOR CHANGE: 0
ABDOMINAL PAIN: 0
RHINORRHEA: 0
EYE REDNESS: 0
NAUSEA: 0
SHORTNESS OF BREATH: 0
BACK PAIN: 0
COUGH: 0
CONSTIPATION: 0
EYE ITCHING: 0
DIARRHEA: 0
SINUS PRESSURE: 0
BLOOD IN STOOL: 0
WHEEZING: 0
VOMITING: 0

## 2021-05-17 NOTE — ASSESSMENT & PLAN NOTE
At this time we will proceed with EMG to evaluate for cause of neuralgia. Medrol Dosepak sent for pain relief.   Further recommendations following

## 2021-05-17 NOTE — PROGRESS NOTES
Sher Salamanca (:  1970) is a 44 y.o. female,Established patient, here for evaluation of the following chief complaint(s):  Pain (thigh pain)      ASSESSMENT/PLAN:  1. Neuropathy involving both lower extremities  -     EMG; Future  2. Neuropathy of right lower extremity  Assessment & Plan: At this time we will proceed with EMG to evaluate for cause of neuralgia. Medrol Dosepak sent for pain relief. Further recommendations following      No follow-ups on file. SUBJECTIVE/OBJECTIVE:  HPI  Patient the office with ongoing neuropathy to the right lower extremity. She states it starts in her thigh and travels down. She was given Medrol Dosepak last week for suspected sciatic nerve pain as there was some pain in the back as well but now her symptoms are back. She did report that the Medrol Dosepak completely resolved symptoms however. She has had EMGs in the past and is familiar with this procedure and getting this done. Current Outpatient Medications   Medication Sig Dispense Refill    predniSONE (DELTASONE) 20 MG tablet Take 1 tablet by mouth 2 times daily for 5 days 10 tablet 0    ibuprofen (ADVIL;MOTRIN) 800 MG tablet Take 1 tablet by mouth every 8 hours as needed for Pain 30 tablet 0    naproxen (NAPROSYN) 250 MG tablet Take 1 tablet by mouth 2 times daily as needed for Pain 60 tablet 0    nystatin (MYCOSTATIN) 326596 UNIT/GM powder Apply 3 times daily. 45 g 0    nystatin (MYCOSTATIN) 189318 UNIT/GM cream Apply topically 2 times daily. 30 g 1    hydroquinone 4 % cream Apply topically 2 times daily. 30 g 2    naproxen (NAPROSYN) 500 MG tablet Take 1 tablet by mouth 2 times daily (with meals) 30 tablet 0    terbinafine (ATHLETES FOOT) 1 % cream Apply topically 2 times daily.  15 g 0    Cholecalciferol (VITAMIN D3) 25 MCG (1000 UT) TABS Take 2 tablets by mouth daily 180 tablet 0    naproxen (NAPROSYN) 500 MG tablet Take 1 tablet by mouth 2 times daily for 10 days 20 tablet 0     No current facility-administered medications for this visit. Review of Systems   Constitutional: Negative for chills, fatigue and fever. HENT: Negative for congestion, ear pain, postnasal drip, rhinorrhea, sinus pressure, sneezing and sore throat. Eyes: Negative for redness and itching. Respiratory: Negative for cough, chest tightness, shortness of breath and wheezing. Cardiovascular: Negative for chest pain and palpitations. Gastrointestinal: Negative for abdominal pain, blood in stool, constipation, diarrhea, nausea and vomiting. Endocrine: Negative for cold intolerance and heat intolerance. Genitourinary: Negative for difficulty urinating, dysuria, flank pain, frequency, hematuria and urgency. Musculoskeletal: Negative for arthralgias, back pain, joint swelling and myalgias. Skin: Negative for color change, pallor, rash and wound. Allergic/Immunologic: Negative for environmental allergies and food allergies. Neurological: Positive for numbness (right lower extremity, with pain ). Negative for dizziness, seizures, syncope, weakness, light-headedness and headaches. Hematological: Negative for adenopathy. Does not bruise/bleed easily. Psychiatric/Behavioral: Negative for confusion, sleep disturbance and suicidal ideas. The patient is not nervous/anxious and is not hyperactive. Vitals:    05/14/21 1033   BP: 136/82   Site: Left Upper Arm   Position: Sitting   Cuff Size: Large Adult   Weight: 215 lb (97.5 kg)   Height: 5' 2\" (1.575 m)       Physical Exam  Constitutional:       Appearance: Normal appearance. She is well-developed. HENT:      Head: Normocephalic and atraumatic. Right Ear: Hearing normal.      Left Ear: Hearing normal.      Nose: No mucosal edema. Right Sinus: No maxillary sinus tenderness or frontal sinus tenderness. Left Sinus: No maxillary sinus tenderness or frontal sinus tenderness. Mouth/Throat: Tonsils: No tonsillar abscesses. Eyes:      Extraocular Movements: Extraocular movements intact. Pupils: Pupils are equal, round, and reactive to light. Cardiovascular:      Rate and Rhythm: Normal rate and regular rhythm. Pulses: Normal pulses. Heart sounds: Normal heart sounds. Pulmonary:      Effort: Pulmonary effort is normal.      Breath sounds: Normal breath sounds. Lymphadenopathy:      Head:      Right side of head: No submental, submandibular, tonsillar, preauricular, posterior auricular or occipital adenopathy. Left side of head: No submental, submandibular, tonsillar, preauricular, posterior auricular or occipital adenopathy. Skin:     General: Skin is warm and dry. Neurological:      Mental Status: She is alert. Psychiatric:         Mood and Affect: Mood normal.         Behavior: Behavior normal.           On this date 5/14/2021 I have spent 30 minutes reviewing previous notes, test results and face to face with the patient discussing the diagnosis and importance of compliance with the treatment plan as well as documenting on the day of the visit. An electronic signature was used to authenticate this note.     --SILVIA Hudson - CNP

## 2021-06-02 ENCOUNTER — TELEPHONE (OUTPATIENT)
Dept: INTERNAL MEDICINE CLINIC | Age: 51
End: 2021-06-02

## 2021-06-02 NOTE — TELEPHONE ENCOUNTER
----- Message from Mi Epstein sent at 6/2/2021  3:38 PM EDT -----  Subject: Message to Provider    QUESTIONS  Information for Provider? Pt is wanting to see if she can have physical   therapy, she missed her EMG appointment because she forgot about it, she   is wanting to get that rescheduled as well.   ---------------------------------------------------------------------------  --------------  CALL BACK INFO  What is the best way for the office to contact you? Do not leave any   message, patient will call back for answer  Preferred Call Back Phone Number? [phone]  ---------------------------------------------------------------------------  --------------  SCRIPT ANSWERS  Relationship to Patient?  Self

## 2021-06-02 NOTE — TELEPHONE ENCOUNTER
Pt is wanting to see if she can have physical   therapy, she missed her EMG appointment because she forgot about it, she   is wanting to get that rescheduled as well.      Please advise

## 2021-07-30 ENCOUNTER — TELEPHONE (OUTPATIENT)
Dept: INTERNAL MEDICINE CLINIC | Age: 51
End: 2021-07-30

## 2021-07-30 NOTE — TELEPHONE ENCOUNTER
Pt called in for refill  LOV 5/14/21  No future visits            clindamycin (CLEOCIN-T) 1 % gel       [facility]/pharmacy #[id number]- [place], [place] - [address] - P [phone] - F [phone]   [facility] [id number]   Phone:  [phone]  Fax:  [phone]

## 2021-08-02 RX ORDER — CLINDAMYCIN PHOSPHATE 10 MG/G
GEL TOPICAL
Qty: 60 G | Refills: 5 | Status: SHIPPED | OUTPATIENT
Start: 2021-08-02 | End: 2021-08-09

## 2021-08-11 ENCOUNTER — NURSE TRIAGE (OUTPATIENT)
Dept: OTHER | Facility: CLINIC | Age: 51
End: 2021-08-11

## 2021-08-11 NOTE — TELEPHONE ENCOUNTER
Received call from Rakel Singh at [facility] with [employer] Complaint. Brief description of triage: Pt stubbed her 4th toe on her right foot on Sunday. Toe is pretty swollen, some bruising. Pain when up moving is 6/10. Nail is intact and no bleeding under the nail. Still able to walk and get shoes on. Triage indicates for patient to see today of tomorrow. Care advice provided, patient verbalizes understanding; denies any other questions or concerns; instructed to call back for any new or worsening symptoms. Writer provided warm transfer to [place] at [facility] for appointment scheduling. Attention Provider: Thank you for allowing me to participate in the care of your patient. The patient was connected to triage in response to information provided to the [facility]. Please do not respond through this encounter as the response is not directed to a shared pool. Reason for Disposition   Patient wants to be seen    Answer Assessment - Initial Assessment Questions  1. MECHANISM: \"How did the injury happen? \"       Stubbed toe     2. ONSET: \"When did the injury happen? \" (Minutes or hours ago)       Occurred on Sunday    3. LOCATION: \"What part of the toe is injured? \" \"Is the nail damaged? \"   Right foot, toe next to pinky toe    4. APPEARANCE of TOE INJURY: \"What does the injury look like? \"   Toe is puffy-\"fat merlin sausage\"    5. SEVERITY: \"Can you use the foot normally? \" \"Can you walk? \"      Can walk but is limping    6. SIZE: For cuts, bruises, or swelling, ask: \"How large is it? \" (e.g., inches or centimeters;  entire toe)      Pretty swollen  Some bruising    7. PAIN: \"Is there pain? \" If so, ask: \"How bad is the pain? \"   (e.g., Scale 1-10; or mild, moderate, severe)  Pain worse yesterday  No pain if not moving toe. Pain will be a 6/10 when up and moving    8. TETANUS: For any breaks in the skin, ask: \"When was the last tetanus booster?\"    9.  DIABETES: \"Do you have a history of diabetes or poor circulation in the feet? \"  No    10. OTHER SYMPTOMS: \"Do you have any other symptoms? \"   Nail intact  No bleeding under nail  Unsure about numbness or tingling    11. PREGNANCY: \"Is there any chance you are pregnant? \" \"When was your last menstrual period? \"    Protocols used: TOE INJURY-ADULT-[place]

## 2021-08-12 ENCOUNTER — APPOINTMENT (OUTPATIENT)
Dept: GENERAL RADIOLOGY | Age: 51
End: 2021-08-12
Payer: COMMERCIAL

## 2021-08-12 ENCOUNTER — HOSPITAL ENCOUNTER (EMERGENCY)
Age: 51
Discharge: HOME | End: 2021-08-12
Attending: EMERGENCY MEDICINE
Payer: COMMERCIAL

## 2021-08-12 VITALS
WEIGHT: 204 LBS | RESPIRATION RATE: 14 BRPM | TEMPERATURE: 99.6 F | DIASTOLIC BLOOD PRESSURE: 84 MMHG | HEIGHT: 62 IN | OXYGEN SATURATION: 96 % | SYSTOLIC BLOOD PRESSURE: 177 MMHG | BODY MASS INDEX: 37.54 KG/M2 | HEART RATE: 89 BPM

## 2021-08-12 DIAGNOSIS — S90.121A CONTUSION OF LESSER TOE OF RIGHT FOOT WITHOUT DAMAGE TO NAIL, INITIAL ENCOUNTER: Primary | ICD-10-CM

## 2021-08-12 DIAGNOSIS — S90.31XA CONTUSION OF RIGHT FOOT, INITIAL ENCOUNTER: ICD-10-CM

## 2021-08-12 PROCEDURE — 73630 X-RAY EXAM OF FOOT: CPT

## 2021-08-12 PROCEDURE — 6370000000 HC RX 637 (ALT 250 FOR IP): Performed by: EMERGENCY MEDICINE

## 2021-08-12 PROCEDURE — 99283 EMERGENCY DEPT VISIT LOW MDM: CPT

## 2021-08-12 RX ORDER — LIDOCAINE AND PRILOCAINE 25; 25 MG/G; MG/G
CREAM TOPICAL ONCE
Status: COMPLETED | OUTPATIENT
Start: 2021-08-12 | End: 2021-08-12

## 2021-08-12 RX ADMIN — LIDOCAINE AND PRILOCAINE: 25; 25 CREAM TOPICAL at 17:45

## 2021-08-12 ASSESSMENT — ENCOUNTER SYMPTOMS: COLOR CHANGE: 1

## 2021-08-12 ASSESSMENT — PAIN DESCRIPTION - ORIENTATION: ORIENTATION: RIGHT

## 2021-08-12 ASSESSMENT — PAIN SCALES - GENERAL: PAINLEVEL_OUTOF10: 6

## 2021-08-12 ASSESSMENT — PAIN DESCRIPTION - PAIN TYPE: TYPE: ACUTE PAIN

## 2021-08-12 NOTE — ED PROVIDER NOTES
Emergency Department Provider Note  Location: [address] [place] [address]  8/12/2021     Patient Identification  Brandon Robison is a 44 y.o. female    Chief Complaint  Toe Injury      Mode of Arrival  private car    HPI  (History provided by patient)  This is a 44 y.o. female presented today for right 4th toe and foot pain. Patient stubbed her toes 2 days ago against a shower stool. She states the pain was tolerable until she manipulated the toe yesterday and then the pain suddenly got a lot worse. It is now swollen and very tender to touch. The distal portion of her foot next to the 4th and 5th toe are also swollen and tender. She rates the pain 6/10 in intensity. She tried Tylenol and ibuprofen with minimal relief. She is able to walk but she has to walk on the heel of her right foot to avoid triggering pain in the 4th toe. She denies loss of sensation. No injury anywhere else. ROS  Review of Systems   Musculoskeletal: Positive for arthralgias (right 4th toe pain). Skin: Positive for color change (erythema and bruising of the right 4th toe). Negative for wound. Neurological: Negative for numbness. I have reviewed the following nursing documentation:  Allergies: Allergies   Allergen Reactions    Shellfish-Derived Products Hives       Past medical history:  has no past medical history on file. Past surgical history:  has a past surgical history that includes Carpal tunnel release (Bilateral). Home medications:   Prior to Admission medications    Medication Sig Start Date End Date Taking? Authorizing Provider   ibuprofen (ADVIL;MOTRIN) 800 MG tablet Take 1 tablet by mouth every 8 hours as needed for Pain 2/23/21   Fausto Petersen, APRN - CNP       Social history:  reports that she has never smoked. She has never used smokeless tobacco. She reports that she does not drink alcohol and does not use drugs. Family history:  No family history on file.     Exam  ED Triage Vitals [08/12/21 1618]   BP Temp Temp Source Pulse Resp SpO2 Height Weight   (!) 177/84 99.6 °F (37.6 °C) Oral 89 14 96 % 5' 2\" (1.575 m) 204 lb (92.5 kg)   Physical Exam  Vitals and nursing note reviewed. Constitutional:       General: She is not in acute distress. Appearance: Normal appearance. She is well-developed. She is not diaphoretic. HENT:      Head: Normocephalic and atraumatic. Eyes:      General: No scleral icterus. Right eye: No discharge. Left eye: No discharge. Neck:      Trachea: No tracheal deviation. Cardiovascular:      Pulses: Normal pulses. Comments: Strong DP and PT pedal pulses on the right  Pulmonary:      Effort: Pulmonary effort is normal. No respiratory distress. Breath sounds: No stridor. Musculoskeletal:         General: Swelling (right 4th toe and distal foot on the lateral side) and tenderness (right 4th toe and distal foot on the lateral side) present. Skin:     General: Skin is warm and dry. Coloration: Skin is not pale. Neurological:      Mental Status: She is alert and oriented to person, place, and time. Cranial Nerves: No dysarthria or facial asymmetry. Sensory: No sensory deficit. Psychiatric:         Mood and Affect: Mood normal.           MDM/ED Course  ED Medication Orders (From admission, onward)    Start Ordered     Status Ordering Provider    08/12/21 1715 08/12/21 1714  lidocaine-prilocaine (EMLA) cream  ONCE      Last MAR action: Given - by Summer Dolan on 08/12/21 at [address] [place] [address]          Radiology  XR FOOT RIGHT (MIN 3 VIEWS)    Result Date: 8/12/2021  EXAM: XR FOOT RIGHT (MIN 3 VIEWS) INDICATION: right 4th toe and distal foot red and swollen; kicked foot stool by accident COMPARISON: None FINDINGS: 3 views. No acute fracture or dislocation. The joint spaces are maintained. No soft tissue swelling.      Normal.      - Patient seen and evaluated in room 3.  44 y.o. female presented for right 4th toe pain and distal foot pain s/p stubbing her toe against a shower stool. Exam showed swelling and erythema localized to area of pain. Strong pedal pulses. Sensation intact. Patient is neurovascularly intact on exam.  X-ray did not show acute fracture. We will provide buddy tape and postop shoe for symptomatic relief and patient can take those off as soon as she felt better. - Return precautions also discussed. patient verbalized understanding of care plan and agreed to follow-up with PCP as advised. I estimate there is LOW risk for COMPARTMENT SYNDROME, DEEP VENOUS THROMBOSIS, SEPTIC ARTHRITIS, TENDON OR NEUROVASCULAR INJURY, thus I consider the discharge disposition reasonable. Nolvia Ledezma and I have discussed the diagnosis and risks, and we agree with discharging home to follow-up with PCP. We also discussed returning to the Emergency Department immediately if new or worsening symptoms occur. We have discussed the symptoms which are most concerning (e.g., changing or worsening pain, numbness, weakness) that necessitate immediate return. Clinical Impression:  1. Contusion of lesser toe of right foot without damage to nail, initial encounter    2. Contusion of right foot, initial encounter        Disposition:  Discharge to home in good condition. Blood pressure (!) 177/84, pulse 89, temperature 99.6 °F (37.6 °C), temperature source Oral, resp. rate 14, height 5' 2\" (1.575 m), weight 204 lb (92.5 kg), last menstrual period 08/12/2021, SpO2 96 %, not currently breastfeeding. Patient was given scripts for the following medications. I counseled patient how to take these medications.    Discharge Medication List as of 8/12/2021  6:38 PM      START taking these medications    Details   diclofenac sodium (VOLTAREN) 1 % GEL Apply 2 g topically 3 times daily as needed for Pain, Topical, 3 TIMES DAILY PRN Starting Thu 8/12/2021, Until Tue 8/17/2021 at 2359, For 5 days, Disp-50 g, R-0, Print Disposition referral (if applicable):  John Mayes SILVIA - CNP  [address]  [place] [address]  [phone]    Schedule an appointment as soon as possible for a visit in 1 week         This chart was generated in part by using Dragon Dictation system and may contain errors related to that system including errors in grammar, punctuation, and spelling, as well as words and phrases that may be inappropriate. If there are any questions or concerns please feel free to contact the dictating provider for clarification.      Devin Archuleta MD  [address] [facility] Yazmin Marsh MD  08/12/21 8296

## 2021-10-11 ENCOUNTER — OFFICE VISIT (OUTPATIENT)
Dept: INTERNAL MEDICINE CLINIC | Age: 51
End: 2021-10-11
Payer: COMMERCIAL

## 2021-10-11 ENCOUNTER — TELEPHONE (OUTPATIENT)
Dept: INTERNAL MEDICINE CLINIC | Age: 51
End: 2021-10-11

## 2021-10-11 VITALS
HEIGHT: 62 IN | SYSTOLIC BLOOD PRESSURE: 152 MMHG | DIASTOLIC BLOOD PRESSURE: 80 MMHG | WEIGHT: 195 LBS | BODY MASS INDEX: 35.88 KG/M2

## 2021-10-11 DIAGNOSIS — Z00.00 ROUTINE GENERAL MEDICAL EXAMINATION AT A HEALTH CARE FACILITY: ICD-10-CM

## 2021-10-11 DIAGNOSIS — R03.0 ELEVATED BP WITHOUT DIAGNOSIS OF HYPERTENSION: ICD-10-CM

## 2021-10-11 DIAGNOSIS — Z12.31 ENCOUNTER FOR SCREENING MAMMOGRAM FOR MALIGNANT NEOPLASM OF BREAST: Primary | ICD-10-CM

## 2021-10-11 PROCEDURE — G8484 FLU IMMUNIZE NO ADMIN: HCPCS | Performed by: NURSE PRACTITIONER

## 2021-10-11 PROCEDURE — G8417 CALC BMI ABV UP PARAM F/U: HCPCS | Performed by: NURSE PRACTITIONER

## 2021-10-11 PROCEDURE — 3017F COLORECTAL CA SCREEN DOC REV: CPT | Performed by: NURSE PRACTITIONER

## 2021-10-11 PROCEDURE — G8427 DOCREV CUR MEDS BY ELIG CLIN: HCPCS | Performed by: NURSE PRACTITIONER

## 2021-10-11 PROCEDURE — 1036F TOBACCO NON-USER: CPT | Performed by: NURSE PRACTITIONER

## 2021-10-11 PROCEDURE — 99214 OFFICE O/P EST MOD 30 MIN: CPT | Performed by: NURSE PRACTITIONER

## 2021-10-11 ASSESSMENT — ENCOUNTER SYMPTOMS
WHEEZING: 0
DIARRHEA: 0
ABDOMINAL PAIN: 0
VOMITING: 0
COUGH: 0
BLOOD IN STOOL: 0
EYE REDNESS: 0
SINUS PRESSURE: 0
CHEST TIGHTNESS: 0
EYE ITCHING: 0
COLOR CHANGE: 0
RHINORRHEA: 0
CONSTIPATION: 0
SORE THROAT: 0
BACK PAIN: 0
NAUSEA: 0
SHORTNESS OF BREATH: 0

## 2021-10-11 ASSESSMENT — PATIENT HEALTH QUESTIONNAIRE - PHQ9
SUM OF ALL RESPONSES TO PHQ QUESTIONS 1-9: 0
1. LITTLE INTEREST OR PLEASURE IN DOING THINGS: 0
2. FEELING DOWN, DEPRESSED OR HOPELESS: 0
DEPRESSION UNABLE TO ASSESS: FUNCTIONAL CAPACITY MOTIVATION LIMITS ACCURACY
SUM OF ALL RESPONSES TO PHQ9 QUESTIONS 1 & 2: 0

## 2021-10-11 NOTE — PROGRESS NOTES
Gato Goodwin (:  1970) is a 44 y.o. female,Established patient, here for evaluation of the following chief complaint(s): Other (Thumb issue)      ASSESSMENT/PLAN:  1. Encounter for screening mammogram for malignant neoplasm of breast  -     TRACIE DIGITAL SCREEN W OR WO CAD BILATERAL; Future  2. Routine general medical examination at a health care facility  -     CBC; Future  -     Comprehensive Metabolic Panel; Future  -     TSH with Reflex; Future  -     Lipid, Fasting; Future  -     Vitamin D 25 Hydroxy; Future      No follow-ups on file. SUBJECTIVE/OBJECTIVE:  EDUARDA Mcgowan is in the office today originally with reports of a swollen thumb but this is completely resolved. She still wanted to come in to get checked to make sure she was up-to-date with everything. She is due for fasting labs along with a mammogram.  Her blood pressure is elevated today. She does not want to start medication. She does have the ability to check her blood pressure at home but has not been doing so. She denies any chest pain, shortness of breath, headaches, or blurred vision. Current Outpatient Medications   Medication Sig Dispense Refill    ibuprofen (ADVIL;MOTRIN) 800 MG tablet Take 1 tablet by mouth every 8 hours as needed for Pain 30 tablet 0    nystatin (MYCOSTATIN) 178601 UNIT/GM powder Apply 3 times daily. 45 g 0    nystatin (MYCOSTATIN) 499951 UNIT/GM cream Apply topically 2 times daily. 30 g 1    hydroquinone 4 % cream Apply topically 2 times daily. 30 g 2    naproxen (NAPROSYN) 500 MG tablet Take 1 tablet by mouth 2 times daily (with meals) 30 tablet 0    terbinafine (ATHLETES FOOT) 1 % cream Apply topically 2 times daily.  15 g 0    diclofenac sodium (VOLTAREN) 1 % GEL Apply 2 g topically 3 times daily as needed for Pain 50 g 0    Cholecalciferol (VITAMIN D3) 25 MCG (1000 UT) TABS Take 2 tablets by mouth daily 180 tablet 0    naproxen (NAPROSYN) 500 MG tablet Take 1 tablet by mouth 2 times daily for 10 days 20 tablet 0     No current facility-administered medications for this visit. Review of Systems   Constitutional: Negative for chills, fatigue and fever. HENT: Negative for congestion, ear pain, postnasal drip, rhinorrhea, sinus pressure, sneezing and sore throat. Eyes: Negative for redness and itching. Respiratory: Negative for cough, chest tightness, shortness of breath and wheezing. Cardiovascular: Negative for chest pain and palpitations. Gastrointestinal: Negative for abdominal pain, blood in stool, constipation, diarrhea, nausea and vomiting. Endocrine: Negative for cold intolerance and heat intolerance. Genitourinary: Negative for difficulty urinating, dysuria, flank pain, frequency, hematuria and urgency. Musculoskeletal: Negative for arthralgias, back pain, joint swelling and myalgias. Skin: Negative for color change, pallor, rash and wound. Allergic/Immunologic: Negative for environmental allergies and food allergies. Neurological: Negative for dizziness, seizures, syncope, weakness, light-headedness, numbness and headaches. Hematological: Negative for adenopathy. Does not bruise/bleed easily. Psychiatric/Behavioral: Negative for confusion, sleep disturbance and suicidal ideas. The patient is not nervous/anxious and is not hyperactive. Vitals:    10/11/21 1052 10/11/21 1102   BP: (!) 162/90 (!) 152/80   Site: Left Upper Arm    Position: Sitting    Cuff Size: Medium Adult    Weight: 195 lb (88.5 kg)    Height: 5' 2\" (1.575 m)        Physical Exam  Constitutional:       Appearance: Normal appearance. She is well-developed. HENT:      Head: Normocephalic and atraumatic. Right Ear: Hearing normal.      Left Ear: Hearing normal.      Nose: No mucosal edema. Right Sinus: No maxillary sinus tenderness or frontal sinus tenderness. Left Sinus: No maxillary sinus tenderness or frontal sinus tenderness. Mouth/Throat:       Tonsils: No tonsillar abscesses. Eyes:      Extraocular Movements: Extraocular movements intact. Pupils: Pupils are equal, round, and reactive to light. Cardiovascular:      Rate and Rhythm: Normal rate and regular rhythm. Pulses: Normal pulses. Heart sounds: Normal heart sounds. Pulmonary:      Effort: Pulmonary effort is normal.      Breath sounds: Normal breath sounds. Lymphadenopathy:      Head:      Right side of head: No submental, submandibular, tonsillar, preauricular, posterior auricular or occipital adenopathy. Left side of head: No submental, submandibular, tonsillar, preauricular, posterior auricular or occipital adenopathy. Skin:     General: Skin is warm and dry. Neurological:      Mental Status: She is alert. Psychiatric:         Mood and Affect: Mood normal.         Behavior: Behavior normal.           On this date 10/11/2021 I have spent 30 minutes reviewing previous notes, test results and face to face with the patient discussing the diagnosis and importance of compliance with the treatment plan as well as documenting on the day of the visit. An electronic signature was used to authenticate this note.     --SILVIA Quiñones - CNP

## 2021-10-12 RX ORDER — BLOOD PRESSURE TEST KIT
KIT MISCELLANEOUS
Qty: 1 KIT | Refills: 0 | Status: SHIPPED | OUTPATIENT
Start: 2021-10-12

## 2021-10-13 ENCOUNTER — HOSPITAL ENCOUNTER (OUTPATIENT)
Dept: PHYSICAL THERAPY | Age: 51
Setting detail: THERAPIES SERIES
Discharge: HOME | End: 2021-10-13

## 2021-10-14 ENCOUNTER — TELEPHONE (OUTPATIENT)
Dept: INTERNAL MEDICINE CLINIC | Age: 51
End: 2021-10-14

## 2021-10-15 NOTE — DISCHARGE SUMMARY
[facility] Outpatient Therapy  [address], [address]  Phone: [phone]   Fax: [phone]    Physical Therapy Discharge Summary:     Date:  10/15/2021    Patient Name:  Abhay Acosta    :  1970  MRN: [id number]    Eval Date: 2021  Discharge Date: 10/15/2021 (last seen 2021)   Referring Provider: SILVIA Panda CNP            Medical Diagnosis (with ICD-10):  Lateral epicondylitis of left elbow (M77.12)  Treatment Diagnosis: L elbow pain      Comments:        Cancels/No-shows to Date:     Plan of Care/Treatment Used to Date:  [x] Therapeutic Exercise  [x] Therapeutic Activity   [] Gait Training  [] Neuromuscular Re-education  [x] Manual Therapy  [x] Modalities:         [x] Ultrasound  [] Electrical Stimulation            [] Iontophoresis [] Mechanical Traction           Pain at Eval: 0-5/10  Pain at DC:  0-3/10    Functional Outcome Used:    [] LEFS   [] ABC Scale  [x] UEFI/UEFS  [] Functional Gait Index  [] Modified Oswestry  [] KEN  [] NDI    [] LLIS  [] [facility]    [] Other:     Initial Score (% disability):  30% impaired  Discharge Score (% disability):  7.5% impaired      Episode of Care:   # Visits:  12  Duration (# Weeks):  9.5      Treatment Approach:  [] Surgical  [x] Conservative    Special Certifications/Equipment Used (check all that apply):  [] Lymphedema  [] LSVT  [] OMT-C   [] Women's Health  [] Vestibular   [] Music Treadmill  [] Dry Needling  [] CHT  [] Other:     Co-morbidities/Complexities:   # of relevant co-morbidities:      [x]None     []Other:    []Covid-19   Arthritic conditions   []Rheumatoid arthritis (M05.9)  []Osteoarthritis (M19.91)   Cardiovascular conditions   []Hypertension (I10)  []Hyperlipidemia (E78.5)  []Angina pectoris (I20)  []Atherosclerosis (I70)   Musculoskeletal conditions   []Disc pathology   []Congenital spine pathologies   []Prior surgical intervention  []Osteoporosis (M81.8)  []Osteopenia (M85.8)   Endocrine conditions   []Hypothyroid (E03.9)  []Hyperthyroid Gastrointestinal conditions   []Constipation (E61.46)   Metabolic conditions   []Morbid obesity (E66.01)  []Obesity (E66)  []Diabetes type 1(E10.65)  []Diabetes type 2 (E11.65)  []Neuropathy (G60.9)   Pulmonary conditions   []Asthma (J45)  []Coughing   []COPD (J44.9) Psychological Disorders  []Anxiety (F41.9)  []Depression (F32.9)   []Bipolar (F31.9) Neurological conditions  []CVA (I69)  []Parkinsons (G20)  []Other:         Significant findings at last visit:  · At last visit seen pain 0/10 with movement, 0/10 at rest.  Pain at worst recently 3/10.  Overall is 97-98% improved since IE.   AROM L wrist WFLs without pain with elbow extended and shoulder IR reach [facility] and without pain.  Strength L wrist ext 5/5, L shoulder flex/abd 5/5 each without pain.  UEFI = 74/80 = 7.5% impaired           Progress towards goals:    Short term goals  Time Frame for Short term goals: 2 weeks  Short term goal 1: Pt will demo good understanding and knowledge of initial HEP MET  Short term goal 2: Pt with only mild tenderness L lateral epicondyle MET  Long term goals  Time Frame for Long term goals : 4 weeks  Long term goal 1: Pt will demo good understanding and knowledge of HEP progressions MET  Long term goal 2: Decreased pain 1/10 at worst to allow for N functional mobility as previous nearly met  Long term goal 3: AROM L shoulder IR reach WFLs and wrist ext WFLs with elbow extended each without pain to allow for N reaching tasks MET  Long term goal 4: Improve strength with L shoulder flex/abd and L wrist ext 5/5 to allow for N carrying/lifting without increased pain MET  Long term goal 5: Decreased impairment per UEFI <10% impaired MET       Goal Status:  [x] Achieved [] Partially Achieved  [] Not Achieved     Patient Status: [x] Patient now discharged, pt has made great progress towards goals and chart was held for 30 days per pt request without call back to reschedule so will D/C. Recommend pt continue with HEP as instructed and  F/U with Dr as needed. Electronically signed by:   Esther Euceda, DPT [id number]

## 2022-06-29 ENCOUNTER — TELEPHONE (OUTPATIENT)
Dept: OTHER | Facility: CLINIC | Age: 52
End: 2022-06-29

## 2022-06-29 NOTE — TELEPHONE ENCOUNTER
Pt was contacted today as part of [facility] to schedule a Mammogram.       I left a message reminding the patient that they have an open order from SILVIA Ng CNP and to please contact me directly at [phone] to schedule a Mammogram.     Thanks,  Bo Clarke LPN

## 2022-07-26 DIAGNOSIS — Z12.31 ENCOUNTER FOR SCREENING MAMMOGRAM FOR MALIGNANT NEOPLASM OF BREAST: Primary | ICD-10-CM

## 2022-08-26 ENCOUNTER — HOSPITAL ENCOUNTER (OUTPATIENT)
Dept: MAMMOGRAPHY | Age: 52
Discharge: HOME | End: 2022-08-26
Payer: COMMERCIAL

## 2022-08-26 VITALS — HEIGHT: 62 IN | BODY MASS INDEX: 35.88 KG/M2 | WEIGHT: 195 LBS

## 2022-08-26 DIAGNOSIS — Z12.31 VISIT FOR SCREENING MAMMOGRAM: ICD-10-CM

## 2022-08-26 DIAGNOSIS — Z12.31 ENCOUNTER FOR SCREENING MAMMOGRAM FOR MALIGNANT NEOPLASM OF BREAST: ICD-10-CM

## 2022-08-26 PROCEDURE — 77067 SCR MAMMO BI INCL CAD: CPT

## 2022-09-19 ENCOUNTER — TELEPHONE (OUTPATIENT)
Dept: FAMILY MEDICINE CLINIC | Age: 52
End: 2022-09-19

## 2022-09-19 NOTE — TELEPHONE ENCOUNTER
Pt burned hand on hot coffee at 11 am, put under cold water and then put Aquaphor on. Pt's hand is peeling and still stinging.

## 2022-09-20 ENCOUNTER — OFFICE VISIT (OUTPATIENT)
Dept: FAMILY MEDICINE CLINIC | Age: 52
End: 2022-09-20
Payer: COMMERCIAL

## 2022-09-20 VITALS
HEART RATE: 83 BPM | BODY MASS INDEX: 36.25 KG/M2 | HEIGHT: 62 IN | WEIGHT: 197 LBS | SYSTOLIC BLOOD PRESSURE: 122 MMHG | TEMPERATURE: 97 F | OXYGEN SATURATION: 97 % | DIASTOLIC BLOOD PRESSURE: 78 MMHG
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
## 2022-09-20 DIAGNOSIS — B35.4 TINEA CORPORIS: ICD-10-CM

## 2022-09-20 DIAGNOSIS — T30.0 BURN BY HOT LIQUID: ICD-10-CM

## 2022-09-20 PROCEDURE — G8428 CUR MEDS NOT DOCUMENT: HCPCS | Performed by: NURSE PRACTITIONER

## 2022-09-20 PROCEDURE — 1036F TOBACCO NON-USER: CPT | Performed by: NURSE PRACTITIONER

## 2022-09-20 PROCEDURE — 3017F COLORECTAL CA SCREEN DOC REV: CPT | Performed by: NURSE PRACTITIONER

## 2022-09-20 PROCEDURE — 99214 OFFICE O/P EST MOD 30 MIN: CPT | Performed by: NURSE PRACTITIONER

## 2022-09-20 PROCEDURE — G8417 CALC BMI ABV UP PARAM F/U: HCPCS | Performed by: NURSE PRACTITIONER

## 2022-09-20 RX ORDER — NYSTATIN 100000 [USP'U]/G
POWDER TOPICAL
Qty: 45 G | Refills: 0 | Status: SHIPPED | OUTPATIENT
Start: 2022-09-20

## 2022-09-20 RX ORDER — NAPROXEN 500 MG/1
500 TABLET ORAL 2 TIMES DAILY WITH MEALS
Qty: 30 TABLET | Refills: 0 | Status: SHIPPED | OUTPATIENT
Start: 2022-09-20

## 2022-09-20 ASSESSMENT — ENCOUNTER SYMPTOMS
SHORTNESS OF BREATH: 0
DIARRHEA: 0
RHINORRHEA: 0
EYE ITCHING: 0
COLOR CHANGE: 0
BACK PAIN: 0
COUGH: 0
CONSTIPATION: 0
SINUS PRESSURE: 0
NAUSEA: 0
SORE THROAT: 0
CHEST TIGHTNESS: 0
ABDOMINAL PAIN: 0
BLOOD IN STOOL: 0
EYE REDNESS: 0
WHEEZING: 0
VOMITING: 0

## 2022-09-20 ASSESSMENT — PATIENT HEALTH QUESTIONNAIRE - PHQ9
SUM OF ALL RESPONSES TO PHQ9 QUESTIONS 1 & 2: 0
SUM OF ALL RESPONSES TO PHQ QUESTIONS 1-9: 0
SUM OF ALL RESPONSES TO PHQ QUESTIONS 1-9: 0
1. LITTLE INTEREST OR PLEASURE IN DOING THINGS: 0
2. FEELING DOWN, DEPRESSED OR HOPELESS: 0
SUM OF ALL RESPONSES TO PHQ QUESTIONS 1-9: 0
SUM OF ALL RESPONSES TO PHQ QUESTIONS 1-9: 0

## 2022-09-20 NOTE — PROGRESS NOTES
Benito Cunningham (:  1970) is a 46 y.o. female,Established patient, here for evaluation of the following chief complaint(s): Other (Burned hand)      ASSESSMENT/PLAN:  1. Tinea corporis  Assessment & Plan:   Stable, controlled  No changes  Continue current treatment plan     Orders:  -     nystatin (MYCOSTATIN) 950949 UNIT/GM powder; Apply 3 times daily. , Disp-45 g, R-0, Normal  2. Burn by hot liquid  Assessment & Plan:   Burn due hot coffee spill  No concerns  reassurance provided      No follow-ups on file. SUBJECTIVE/OBJECTIVE:    Patient in the office because yesterday at work she spilled coffee on her hand around 11 AM.  She put her under water and was taking naproxen for the discomfort. She states the pain is finally started to subside today. She denies any bruising blistering or rash from the burn. She says there is a very small open area however it is not causing her any discomfort. She states that she also has some pain to her left breast after a self and fell on it just wants to be sure it is okay to continue taking naproxen. She also is in need of a refill for her nystatin. Current Outpatient Medications   Medication Sig Dispense Refill    nystatin (MYCOSTATIN) 213548 UNIT/GM powder Apply 3 times daily. 45 g 0    naproxen (NAPROSYN) 500 MG tablet Take 1 tablet by mouth 2 times daily (with meals) 30 tablet 0    diclofenac sodium (VOLTAREN) 1 % GEL Apply 2 g topically 3 times daily as needed for Pain 50 g 0    ibuprofen (ADVIL;MOTRIN) 800 MG tablet Take 1 tablet by mouth every 8 hours as needed for Pain 30 tablet 0    nystatin (MYCOSTATIN) 270637 UNIT/GM cream Apply topically 2 times daily. 30 g 1    hydroquinone 4 % cream Apply topically 2 times daily.  30 g 2    Blood Pressure Monitoring (COMFORT TOUCH BP CUFF/LARGE) MISC Check BP 1-2 times daily per HTN R03.0 (Patient not taking: Reported on 2022) 1 each 0    Blood Pressure KIT Check blood pressure 1-2 times daily (Patient not taking: Reported on 9/20/2022) 1 kit 0    Cholecalciferol (VITAMIN D3) 25 MCG (1000 UT) TABS Take 2 tablets by mouth daily (Patient not taking: Reported on 9/20/2022) 180 tablet 0     No current facility-administered medications for this visit. Review of Systems   Constitutional:  Negative for chills, fatigue and fever. HENT:  Negative for congestion, ear pain, postnasal drip, rhinorrhea, sinus pressure, sneezing and sore throat. Eyes:  Negative for redness and itching. Respiratory:  Negative for cough, chest tightness, shortness of breath and wheezing. Cardiovascular:  Negative for chest pain and palpitations. Gastrointestinal:  Negative for abdominal pain, blood in stool, constipation, diarrhea, nausea and vomiting. Endocrine: Negative for cold intolerance and heat intolerance. Genitourinary:  Negative for difficulty urinating, dysuria, flank pain, frequency, hematuria and urgency. Musculoskeletal:  Negative for arthralgias, back pain, joint swelling and myalgias. Skin:  Negative for color change, pallor, rash and wound. Allergic/Immunologic: Negative for environmental allergies and food allergies. Neurological:  Negative for dizziness, seizures, syncope, weakness, light-headedness, numbness and headaches. Hematological:  Negative for adenopathy. Does not bruise/bleed easily. Psychiatric/Behavioral:  Negative for confusion, sleep disturbance and suicidal ideas. The patient is not nervous/anxious and is not hyperactive. Vitals:    09/20/22 1013   BP: 122/78   Site: Left Upper Arm   Position: Sitting   Cuff Size: Large Adult   Pulse: 83   Temp: 97 °F (36.1 °C)   SpO2: 97%   Weight: 197 lb (89.4 kg)   Height: 5' 2\" (1.575 m)       Physical Exam  Constitutional:       Appearance: Normal appearance. She is well-developed. HENT:      Head: Normocephalic and atraumatic. Right Ear: Hearing normal.      Left Ear: Hearing normal.      Nose: No mucosal edema.       Right Sinus: No maxillary sinus tenderness or frontal sinus tenderness. Left Sinus: No maxillary sinus tenderness or frontal sinus tenderness. Mouth/Throat: Tonsils: No tonsillar abscesses. Eyes:      Extraocular Movements: Extraocular movements intact. Pupils: Pupils are equal, round, and reactive to light. Cardiovascular:      Rate and Rhythm: Normal rate and regular rhythm. Pulses: Normal pulses. Heart sounds: Normal heart sounds. Pulmonary:      Effort: Pulmonary effort is normal.      Breath sounds: Normal breath sounds. Lymphadenopathy:      Head:      Right side of head: No submental, submandibular, tonsillar, preauricular, posterior auricular or occipital adenopathy. Left side of head: No submental, submandibular, tonsillar, preauricular, posterior auricular or occipital adenopathy. Skin:     General: Skin is warm and dry. Neurological:      Mental Status: She is alert. Psychiatric:         Mood and Affect: Mood normal.         Behavior: Behavior normal.               An electronic signature was used to authenticate this note.     --Barbara Gonzalez, SILVIA - CNP

## 2022-12-13 ENCOUNTER — TELEPHONE (OUTPATIENT)
Dept: FAMILY MEDICINE CLINIC | Age: 52
End: 2022-12-13

## 2022-12-13 NOTE — TELEPHONE ENCOUNTER
Pt tested positive for COVID today, she states she is only experiencing a stuffy nose but wants an antibiotic called in.

## 2022-12-14 ENCOUNTER — TELEMEDICINE (OUTPATIENT)
Dept: FAMILY MEDICINE CLINIC | Age: 52
End: 2022-12-14
Payer: COMMERCIAL

## 2022-12-14 DIAGNOSIS — U07.1 COVID-19: ICD-10-CM

## 2022-12-14 PROCEDURE — 99214 OFFICE O/P EST MOD 30 MIN: CPT | Performed by: NURSE PRACTITIONER

## 2022-12-14 PROCEDURE — G8484 FLU IMMUNIZE NO ADMIN: HCPCS | Performed by: NURSE PRACTITIONER

## 2022-12-14 PROCEDURE — G8417 CALC BMI ABV UP PARAM F/U: HCPCS | Performed by: NURSE PRACTITIONER

## 2022-12-14 PROCEDURE — 1036F TOBACCO NON-USER: CPT | Performed by: NURSE PRACTITIONER

## 2022-12-14 PROCEDURE — G8428 CUR MEDS NOT DOCUMENT: HCPCS | Performed by: NURSE PRACTITIONER

## 2022-12-14 PROCEDURE — 3017F COLORECTAL CA SCREEN DOC REV: CPT | Performed by: NURSE PRACTITIONER

## 2022-12-14 RX ORDER — NIRMATRELVIR AND RITONAVIR 300-100 MG
KIT ORAL
Qty: 30 TABLET | Refills: 0 | Status: SHIPPED | OUTPATIENT
Start: 2022-12-14 | End: 2022-12-14

## 2022-12-14 ASSESSMENT — PATIENT HEALTH QUESTIONNAIRE - PHQ9
SUM OF ALL RESPONSES TO PHQ QUESTIONS 1-9: 0
SUM OF ALL RESPONSES TO PHQ QUESTIONS 1-9: 0
2. FEELING DOWN, DEPRESSED OR HOPELESS: 0
SUM OF ALL RESPONSES TO PHQ QUESTIONS 1-9: 0
1. LITTLE INTEREST OR PLEASURE IN DOING THINGS: 0
SUM OF ALL RESPONSES TO PHQ QUESTIONS 1-9: 0
SUM OF ALL RESPONSES TO PHQ9 QUESTIONS 1 & 2: 0

## 2022-12-14 ASSESSMENT — ENCOUNTER SYMPTOMS
DIARRHEA: 0
BACK PAIN: 0
SORE THROAT: 0
EYE REDNESS: 0
ABDOMINAL PAIN: 0
WHEEZING: 0
COLOR CHANGE: 0
VOMITING: 0
COUGH: 1
SINUS PRESSURE: 0
CONSTIPATION: 0
RHINORRHEA: 0
CHEST TIGHTNESS: 0
EYE ITCHING: 0
BLOOD IN STOOL: 0
NAUSEA: 0
SHORTNESS OF BREATH: 0

## 2022-12-14 NOTE — PROGRESS NOTES
Terence Miller (:  1970) is a Established patient, here for evaluation of the following:    Assessment & Plan   Below is the assessment and plan developed based on review of pertinent history, physical exam, labs, studies, and medications. 1. COVID-19  Assessment & Plan:  Date of With symptoms mild and reassuring at this time continue with symptomatic management with over-the-counter medications. Not indicated and I do not believe that she is in need of Paxlovid. Advised if shortness of breath or chest pain do develop she should return to office or report to the emergency room. No follow-ups on file. Subjective   HPI  Patient comes to the office complaining of cough, congestion, drainage that has been present for the last several days. Her cough is productive of yellow sputum. Patient does not have associated fever or chills. Patient denies gastrointestinal symptoms related to Her present condition. He tested positive for COVID yesterday and she states overall her symptoms are very mild and she is doing well    Review of Systems   Constitutional:  Negative for chills, fatigue and fever. HENT:  Negative for congestion, ear pain, postnasal drip, rhinorrhea, sinus pressure, sneezing and sore throat. Eyes:  Negative for redness and itching. Respiratory:  Positive for cough. Negative for chest tightness, shortness of breath and wheezing. Cardiovascular:  Negative for chest pain and palpitations. Gastrointestinal:  Negative for abdominal pain, blood in stool, constipation, diarrhea, nausea and vomiting. Endocrine: Negative for cold intolerance and heat intolerance. Genitourinary:  Negative for difficulty urinating, dysuria, flank pain, frequency, hematuria and urgency. Musculoskeletal:  Negative for arthralgias, back pain, joint swelling and myalgias. Skin:  Negative for color change, pallor, rash and wound.    Allergic/Immunologic: Negative for environmental allergies and food allergies. Neurological:  Negative for dizziness, seizures, syncope, weakness, light-headedness, numbness and headaches. Hematological:  Negative for adenopathy. Does not bruise/bleed easily. Psychiatric/Behavioral:  Negative for confusion, sleep disturbance and suicidal ideas. The patient is not nervous/anxious and is not hyperactive.          Objective   Patient-Reported Vitals  No data recorded     Physical Exam  [INSTRUCTIONS:  \"[x]\" Indicates a positive item  \"[]\" Indicates a negative item  -- DELETE ALL ITEMS NOT EXAMINED]    Constitutional: [x] Appears well-developed and well-nourished [x] No apparent distress      [] Abnormal -     Mental status: [x] Alert and awake  [x] Oriented to person/place/time [x] Able to follow commands    [] Abnormal -     Eyes:   EOM    [x]  Normal    [] Abnormal -   Sclera  [x]  Normal    [] Abnormal -          Discharge [x]  None visible   [] Abnormal -     HENT: [x] Normocephalic, atraumatic  [] Abnormal -   [x] Mouth/Throat: Mucous membranes are moist    External Ears [x] Normal  [] Abnormal -    Neck: [x] No visualized mass [] Abnormal -     Pulmonary/Chest: [x] Respiratory effort normal   [x] No visualized signs of difficulty breathing or respiratory distress        [] Abnormal -      Musculoskeletal:   [x] Normal gait with no signs of ataxia         [x] Normal range of motion of neck        [] Abnormal -     Neurological:        [x] No Facial Asymmetry (Cranial nerve 7 motor function) (limited exam due to video visit)          [x] No gaze palsy        [] Abnormal -          Skin:        [x] No significant exanthematous lesions or discoloration noted on facial skin         [] Abnormal -            Psychiatric:       [x] Normal Affect [] Abnormal -        [x] No Hallucinations    Other pertinent observable physical exam findings:-         On this date 12/14/2022 I have spent 30 minutes reviewing previous notes, test results and face to face (virtual) with the patient discussing the diagnosis and importance of compliance with the treatment plan as well as documenting on the day of the visit. Donna White, was evaluated through a synchronous (real-time) audio-video encounter. The patient (or guardian if applicable) is aware that this is a billable service, which includes applicable co-pays. This Virtual Visit was conducted with patient's (and/or legal guardian's) consent. The visit was conducted pursuant to the emergency declaration under the [address] [place] [address], [address] [place] [address] authority and the [employer] General Act. Patient identification was verified, and a caregiver was present when appropriate. The patient was located at Home: [address] [place] [id number]. Provider was located at [facility] ([address] [place] [address]): [phone] [address]. [address] [place] [address],  [address] [place] [address].         --Wood Noel, SILVIA - CNP

## 2022-12-14 NOTE — ASSESSMENT & PLAN NOTE
Date of With symptoms mild and reassuring at this time continue with symptomatic management with over-the-counter medications. Not indicated and I do not believe that she is in need of Paxlovid. Advised if shortness of breath or chest pain do develop she should return to office or report to the emergency room.

## 2023-01-10 ENCOUNTER — OFFICE VISIT (OUTPATIENT)
Dept: FAMILY MEDICINE CLINIC | Age: 53
End: 2023-01-10
Payer: COMMERCIAL

## 2023-01-10 VITALS
SYSTOLIC BLOOD PRESSURE: 160 MMHG | DIASTOLIC BLOOD PRESSURE: 90 MMHG | HEART RATE: 73 BPM | OXYGEN SATURATION: 98 % | WEIGHT: 193 LBS | TEMPERATURE: 98.3 F | HEIGHT: 62 IN | BODY MASS INDEX: 35.51 KG/M2

## 2023-01-10 DIAGNOSIS — M70.12 BURSITIS OF LEFT WRIST: ICD-10-CM

## 2023-01-10 DIAGNOSIS — Z00.00 ROUTINE GENERAL MEDICAL EXAMINATION AT A HEALTH CARE FACILITY: ICD-10-CM

## 2023-01-10 DIAGNOSIS — F41.9 ANXIETY: ICD-10-CM

## 2023-01-10 DIAGNOSIS — Z12.11 COLON CANCER SCREENING: Primary | ICD-10-CM

## 2023-01-10 PROCEDURE — 3017F COLORECTAL CA SCREEN DOC REV: CPT | Performed by: NURSE PRACTITIONER

## 2023-01-10 PROCEDURE — 1036F TOBACCO NON-USER: CPT | Performed by: NURSE PRACTITIONER

## 2023-01-10 PROCEDURE — G8427 DOCREV CUR MEDS BY ELIG CLIN: HCPCS | Performed by: NURSE PRACTITIONER

## 2023-01-10 PROCEDURE — 99214 OFFICE O/P EST MOD 30 MIN: CPT | Performed by: NURSE PRACTITIONER

## 2023-01-10 PROCEDURE — G8484 FLU IMMUNIZE NO ADMIN: HCPCS | Performed by: NURSE PRACTITIONER

## 2023-01-10 PROCEDURE — G8417 CALC BMI ABV UP PARAM F/U: HCPCS | Performed by: NURSE PRACTITIONER

## 2023-01-10 RX ORDER — PREDNISONE 20 MG/1
20 TABLET ORAL 2 TIMES DAILY
Qty: 10 TABLET | Refills: 0 | Status: SHIPPED | OUTPATIENT
Start: 2023-01-10 | End: 2023-01-15

## 2023-01-10 SDOH — ECONOMIC STABILITY: FOOD INSECURITY: WITHIN THE PAST 12 MONTHS, YOU WORRIED THAT YOUR FOOD WOULD RUN OUT BEFORE YOU GOT MONEY TO BUY MORE.: NEVER TRUE

## 2023-01-10 SDOH — ECONOMIC STABILITY: FOOD INSECURITY: WITHIN THE PAST 12 MONTHS, THE FOOD YOU BOUGHT JUST DIDN'T LAST AND YOU DIDN'T HAVE MONEY TO GET MORE.: NEVER TRUE

## 2023-01-10 ASSESSMENT — PATIENT HEALTH QUESTIONNAIRE - PHQ9
2. FEELING DOWN, DEPRESSED OR HOPELESS: 0
DEPRESSION UNABLE TO ASSESS: FUNCTIONAL CAPACITY MOTIVATION LIMITS ACCURACY
SUM OF ALL RESPONSES TO PHQ QUESTIONS 1-9: 0
SUM OF ALL RESPONSES TO PHQ QUESTIONS 1-9: 0
1. LITTLE INTEREST OR PLEASURE IN DOING THINGS: 0
SUM OF ALL RESPONSES TO PHQ9 QUESTIONS 1 & 2: 0
SUM OF ALL RESPONSES TO PHQ QUESTIONS 1-9: 0
SUM OF ALL RESPONSES TO PHQ QUESTIONS 1-9: 0

## 2023-01-10 ASSESSMENT — ENCOUNTER SYMPTOMS
VOMITING: 0
SHORTNESS OF BREATH: 0
EYE ITCHING: 0
RHINORRHEA: 0
COLOR CHANGE: 0
ABDOMINAL PAIN: 0
BLOOD IN STOOL: 0
CONSTIPATION: 0
SINUS PRESSURE: 0
EYE REDNESS: 0
CHEST TIGHTNESS: 0
SORE THROAT: 0
DIARRHEA: 0
COUGH: 0
NAUSEA: 0
BACK PAIN: 0
WHEEZING: 0

## 2023-01-10 ASSESSMENT — SOCIAL DETERMINANTS OF HEALTH (SDOH): HOW HARD IS IT FOR YOU TO PAY FOR THE VERY BASICS LIKE FOOD, HOUSING, MEDICAL CARE, AND HEATING?: NOT HARD AT ALL
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
## 2023-01-10 NOTE — ASSESSMENT & PLAN NOTE
Patient was tearful in appointment today when discussing mother's diagnosis and the care she needs to provide for her.   I believe at this time she would benefit from cognitive behavioral therapy and referred her to Dr. Kendal Rice.  She has made an appointment

## 2023-01-10 NOTE — ASSESSMENT & PLAN NOTE
Overuse injury and it is improving continue with rest and anti-inflammatory.   We will send prednisone to the pharmacy for recovery

## 2023-02-15 ENCOUNTER — OFFICE VISIT (OUTPATIENT)
Dept: PSYCHOLOGY | Age: 53
End: 2023-02-15

## 2023-02-15 DIAGNOSIS — F41.9 ANXIETY: Primary | ICD-10-CM
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
## 2023-03-01 ENCOUNTER — TELEPHONE (OUTPATIENT)
Dept: FAMILY MEDICINE CLINIC | Age: 53
End: 2023-03-01

## 2023-03-01 NOTE — TELEPHONE ENCOUNTER
Pt came in yesterday with her daughter  Nikhil Johnston mentioned some creams she could use for her arthritic hands  Asking if Nikhil Johnston can call one in to     [address] [place] [address] - [facility] [address] [place] [address]. - P [phone] - F [phone]

## 2023-03-07 ENCOUNTER — OFFICE VISIT (OUTPATIENT)
Dept: FAMILY MEDICINE CLINIC | Age: 53
End: 2023-03-07

## 2023-03-07 ENCOUNTER — HOSPITAL ENCOUNTER (OUTPATIENT)
Dept: GENERAL RADIOLOGY | Age: 53
Discharge: HOME | End: 2023-03-07
Payer: COMMERCIAL

## 2023-03-07 VITALS
HEART RATE: 94 BPM | BODY MASS INDEX: 35.85 KG/M2 | DIASTOLIC BLOOD PRESSURE: 94 MMHG | TEMPERATURE: 96.9 F | WEIGHT: 196 LBS | OXYGEN SATURATION: 98 % | SYSTOLIC BLOOD PRESSURE: 162 MMHG

## 2023-03-07 DIAGNOSIS — Z00.00 ENCOUNTER FOR WELL ADULT EXAM WITHOUT ABNORMAL FINDINGS: ICD-10-CM

## 2023-03-07 DIAGNOSIS — B35.4 TINEA CORPORIS: ICD-10-CM

## 2023-03-07 DIAGNOSIS — M25.532 LEFT WRIST PAIN: ICD-10-CM

## 2023-03-07 DIAGNOSIS — Z12.11 COLON CANCER SCREENING: ICD-10-CM

## 2023-03-07 DIAGNOSIS — Z00.00 ROUTINE GENERAL MEDICAL EXAMINATION AT A HEALTH CARE FACILITY: ICD-10-CM

## 2023-03-07 DIAGNOSIS — Z12.4 SCREENING FOR CERVICAL CANCER: Primary | ICD-10-CM

## 2023-03-07 DIAGNOSIS — I10 PRIMARY HYPERTENSION: ICD-10-CM

## 2023-03-07 LAB
A/G RATIO: 1.5 (ref 1.1–2.2)
ALBUMIN SERPL-MCNC: 4.4 G/DL (ref 3.4–5)
ALP BLD-CCNC: 96 U/L (ref 40–129)
ALT SERPL-CCNC: 8 U/L (ref 10–40)
ANION GAP SERPL CALCULATED.3IONS-SCNC: 14 MMOL/L (ref 3–16)
AST SERPL-CCNC: 16 U/L (ref 15–37)
BILIRUB SERPL-MCNC: <0.2 MG/DL (ref 0–1)
BUN BLDV-MCNC: 9 MG/DL (ref 7–20)
CALCIUM SERPL-MCNC: 9.6 MG/DL (ref 8.3–10.6)
CHLORIDE BLD-SCNC: 106 MMOL/L (ref 99–110)
CHOLESTEROL, FASTING: 174 MG/DL (ref 0–199)
CO2: 21 MMOL/L (ref 21–32)
CONTROL: NORMAL
CONTROL: NORMAL
CREAT SERPL-MCNC: 0.7 MG/DL (ref 0.6–1.1)
GFR SERPL CREATININE-BSD FRML MDRD: >60 ML/MIN/{1.73_M2}
GLUCOSE BLD-MCNC: 86 MG/DL (ref 70–99)
HCT VFR BLD CALC: 38 % (ref 36–48)
HDLC SERPL-MCNC: 53 MG/DL (ref 40–60)
HEMOCCULT STL QL: NORMAL
HEMOCCULT STL QL: NORMAL
HEMOGLOBIN: 12.5 G/DL (ref 12–16)
LDL CHOLESTEROL CALCULATED: 100 MG/DL
MCH RBC QN AUTO: 29.8 PG (ref 26–34)
MCHC RBC AUTO-ENTMCNC: 32.8 G/DL (ref 31–36)
MCV RBC AUTO: 90.8 FL (ref 80–100)
PDW BLD-RTO: 14.8 % (ref 12.4–15.4)
PLATELET # BLD: 308 K/UL (ref 135–450)
PMV BLD AUTO: 7.7 FL (ref 5–10.5)
POTASSIUM SERPL-SCNC: 4.3 MMOL/L (ref 3.5–5.1)
RBC # BLD: 4.19 M/UL (ref 4–5.2)
SODIUM BLD-SCNC: 141 MMOL/L (ref 136–145)
TOTAL PROTEIN: 7.3 G/DL (ref 6.4–8.2)
TRIGLYCERIDE, FASTING: 106 MG/DL (ref 0–150)
VLDLC SERPL CALC-MCNC: 21 MG/DL
WBC # BLD: 10.4 K/UL (ref 4–11)

## 2023-03-07 PROCEDURE — 73110 X-RAY EXAM OF WRIST: CPT

## 2023-03-07 RX ORDER — NYSTATIN 100000 U/G
CREAM TOPICAL
Qty: 30 G | Refills: 1 | Status: SHIPPED | OUTPATIENT
Start: 2023-03-07

## 2023-03-07 ASSESSMENT — ENCOUNTER SYMPTOMS
NAUSEA: 0
COUGH: 0
SINUS PRESSURE: 0
EYE ITCHING: 0
WHEEZING: 0
DIARRHEA: 0
CHEST TIGHTNESS: 0
SHORTNESS OF BREATH: 0
BLOOD IN STOOL: 0
BACK PAIN: 0
RHINORRHEA: 0
EYE REDNESS: 0
CONSTIPATION: 0
VOMITING: 0
COLOR CHANGE: 0
ABDOMINAL PAIN: 0
SORE THROAT: 0

## 2023-03-07 NOTE — PROGRESS NOTES
Jaquan Valenzuela (:  1970) is a 45 y.o. female,Established patient, here for evaluation of the following chief complaint(s):  Gynecologic Exam      ASSESSMENT/PLAN:  1. Screening for cervical cancer  Assessment & Plan:  Pap smear performed in office. Will send to lab today. Breast exam performed, no masses, tenderness or dimpling. Rectal exam performed, no masses palpated. Stool FIT test negative. Orders:  -     PAP SMEAR  2. Left wrist pain  Assessment & Plan:  Patient continues to experience left wrist pain with motion. Will get wrist x-ray today to monitor for acute changes. Orders:  -     XR WRIST LEFT (MIN 3 VIEWS); Future  3. Tinea corporis  Assessment & Plan:  Stable, controlled. Continue treatment plan. Will send refill today. Orders:  -     nystatin (MYCOSTATIN) [id number] UNIT/GM cream; Apply topically 2 times daily. , Disp-30 g, R-1, Normal  4. Colon cancer screening  -     POCT Fecal Immunochemical Test (FIT); Future  -     POCT Fecal Immunochemical Test (FIT); Future  5. Encounter for well adult exam without abnormal findings  6. Primary hypertension  Assessment & Plan:   Elevated in office  Pt. Would like to check bp at home first  I think this is reasonable as she is asymptomatic  We will check labs  Cuff ordered  If remains elevated, start meds  Follow up in 1 month or sooner if needed    No follow-ups on file. SUBJECTIVE/OBJECTIVE:  Patient presents to office today for Pap smear and breast examination. She has no gynecological concerns today. She is still having occasional periods, her last was in January, prior to that was months ago. Light in nature, denies menstrual pain. She is not currently sexually active for past 2 years. She is not on birth control. She denies ithcing, pain, abnormal vaginal discharge. Up to date on mammo. She is due for colonoscopy, has transportation issues which is why she has not gotten this done yet. Denies c/d/n/v.  Denies blood in stool.        Current Outpatient Medications   Medication Sig Dispense Refill    Blood Pressure Monitoring (COMFORT TOUCH BP CUFF/LARGE) MISC Test bp daily 1 each 0    nystatin (MYCOSTATIN) 000805 UNIT/GM cream Apply topically 2 times daily. 30 g 1    diclofenac sodium (VOLTAREN) 1 % GEL Apply 4 g topically 4 times daily 50 g 5    nystatin (MYCOSTATIN) 148202 UNIT/GM powder Apply 3 times daily. 45 g 0    naproxen (NAPROSYN) 500 MG tablet Take 1 tablet by mouth 2 times daily (with meals) 30 tablet 0    ibuprofen (ADVIL;MOTRIN) 800 MG tablet Take 1 tablet by mouth every 8 hours as needed for Pain 30 tablet 0    hydroquinone 4 % cream Apply topically 2 times daily. 30 g 2    diclofenac sodium (VOLTAREN) 1 % GEL Apply 2 g topically 3 times daily as needed for Pain 50 g 0     No current facility-administered medications for this visit. Review of Systems   Constitutional:  Negative for chills, fatigue and fever. HENT:  Negative for congestion, ear pain, postnasal drip, rhinorrhea, sinus pressure, sneezing and sore throat. Eyes:  Negative for redness and itching. Respiratory:  Negative for cough, chest tightness, shortness of breath and wheezing. Cardiovascular:  Negative for chest pain and palpitations. Gastrointestinal:  Negative for abdominal pain, blood in stool, constipation, diarrhea, nausea and vomiting. Endocrine: Negative for cold intolerance and heat intolerance. Genitourinary:  Negative for difficulty urinating, dysuria, flank pain, frequency, hematuria, menstrual problem, pelvic pain, urgency, vaginal bleeding and vaginal discharge. Musculoskeletal:  Negative for arthralgias, back pain, joint swelling and myalgias. Skin:  Negative for color change, pallor, rash and wound. Allergic/Immunologic: Negative for environmental allergies and food allergies. Neurological:  Negative for dizziness, seizures, syncope, weakness, light-headedness, numbness and headaches.    Hematological: Negative for adenopathy. Does not bruise/bleed easily. Psychiatric/Behavioral:  Negative for confusion, sleep disturbance and suicidal ideas. The patient is not nervous/anxious and is not hyperactive. Vitals:    03/07/23 1109   BP: (!) 162/94   Site: Left Upper Arm   Position: Sitting   Cuff Size: Medium Adult   Pulse: 94   Temp: 96.9 °F (36.1 °C)   SpO2: 98%   Weight: 196 lb (88.9 kg)       Physical Exam  Vitals reviewed. Exam conducted with a chaperone present. Constitutional:       Appearance: Normal appearance. She is well-developed. HENT:      Head: Normocephalic and atraumatic. Right Ear: Hearing normal.      Left Ear: Hearing normal.      Nose: No mucosal edema. Right Sinus: No maxillary sinus tenderness or frontal sinus tenderness. Left Sinus: No maxillary sinus tenderness or frontal sinus tenderness. Mouth/Throat: Tonsils: No tonsillar abscesses. Eyes:      Extraocular Movements: Extraocular movements intact. Pupils: Pupils are equal, round, and reactive to light. Cardiovascular:      Rate and Rhythm: Normal rate and regular rhythm. Pulses: Normal pulses. Heart sounds: Normal heart sounds. Pulmonary:      Effort: Pulmonary effort is normal.      Breath sounds: Normal breath sounds. Chest:   Breasts:     Right: Normal. No inverted nipple, mass or tenderness. Left: Normal. No inverted nipple, mass or tenderness. Abdominal:      General: Abdomen is flat. Palpations: Abdomen is soft. Genitourinary:     General: Normal vulva. Vagina: Normal. No erythema, tenderness or lesions. Cervix: No erythema. Rectum: Normal. No mass, tenderness, external hemorrhoid or internal hemorrhoid. Musculoskeletal:         General: Normal range of motion. Lymphadenopathy:      Head:      Right side of head: No submental, submandibular, tonsillar, preauricular, posterior auricular or occipital adenopathy.       Left side of head: No submental, submandibular, tonsillar, preauricular, posterior auricular or occipital adenopathy. Skin:     General: Skin is warm and dry. Neurological:      Mental Status: She is alert and oriented to person, place, and time. Psychiatric:         Mood and Affect: Mood normal.         Behavior: Behavior normal.               An electronic signature was used to authenticate this note.     --SILVIA Quiñones - CNP

## 2023-03-07 NOTE — ASSESSMENT & PLAN NOTE
Elevated in office  Pt.  Would like to check bp at home first  I think this is reasonable as she is asymptomatic  We will check labs  Cuff ordered  If remains elevated, start meds  Follow up in 1 month or sooner if needed

## 2023-03-07 NOTE — ASSESSMENT & PLAN NOTE
Pap smear performed in office. Will send to lab today. Breast exam performed, no masses, tenderness or dimpling. Rectal exam performed, no masses palpated. Stool FIT test negative.

## 2023-03-07 NOTE — PATIENT INSTRUCTIONS
Starting a Weight Loss Plan: Care Instructions  Overview     If you're thinking about losing weight, it can be hard to know where to start. Your doctor can help you set up a weight loss plan that best meets your needs. You may want to take a class on nutrition or exercise, or you could join a weight loss support group. If you have questions about how to make changes to your eating or exercise habits, ask your doctor about seeing a registered dietitian or an exercise specialist.  It can be a big challenge to lose weight. But you don't have to make huge changes at once. Make small changes, and stick with them. When those changes become habit, add a few more changes. If you don't think you're ready to make changes right now, try to pick a date in the future. Make an appointment to see your doctor to discuss whether the time is right for you to start a plan. Follow-up care is a key part of your treatment and safety. Be sure to make and go to all appointments, and call your doctor if you are having problems. It's also a good idea to know your test results and keep a list of the medicines you take. How can you care for yourself at home? Set realistic goals. Many people expect to lose much more weight than is likely. A weight loss of 5% to 10% of your body weight may be enough to improve your health. Get family and friends involved to provide support. Talk to them about why you are trying to lose weight, and ask them to help. They can help by participating in exercise and having meals with you, even if they may be eating something different. Find what works best for you. If you do not have time or do not like to cook, a program that offers meal replacement bars or shakes may be better for you. Or if you like to prepare meals, finding a plan that includes daily menus and recipes may be best.  Ask your doctor about other health professionals who can help you achieve your weight loss goals.   A dietitian can help you make healthy changes in your diet. An exercise specialist or  can help you develop a safe and effective exercise program.  A counselor or psychiatrist can help you cope with issues such as depression, anxiety, or family problems that can make it hard to focus on weight loss. Consider joining a support group for people who are trying to lose weight. Your doctor can suggest groups in your area. Where can you learn more? Go to http://www.woods.com/ and enter U357 to learn more about \"Starting a Weight Loss Plan: Care Instructions. \"  Current as of: August 25, 2022               Content Version: 13.5  © 6001-4543 Healthwise, Incorporated. Care instructions adapted under license by [facility] ([facility]). If you have questions about a medical condition or this instruction, always ask your healthcare professional. [address] any warranty or liability for your use of this information.

## 2023-03-07 NOTE — ASSESSMENT & PLAN NOTE
Patient continues to experience left wrist pain with motion. Will get wrist x-ray today to monitor for acute changes.

## 2023-03-08 DIAGNOSIS — R76.8 HEPATITIS C ANTIBODY TEST POSITIVE: Primary | ICD-10-CM

## 2023-03-08 LAB
ESTIMATED AVERAGE GLUCOSE: 114 MG/DL
HBA1C MFR BLD: 5.6 %
HEPATITIS C ANTIBODY INTERPRETATION: REACTIVE
HIV AG/AB: NORMAL
HIV ANTIGEN: NORMAL
HIV-1 ANTIBODY: NORMAL
HIV-2 AB: NORMAL

## 2023-03-22 DIAGNOSIS — R76.8 HEPATITIS C ANTIBODY TEST POSITIVE: ICD-10-CM

## 2023-03-24 LAB
HCV AB S/CO SERPL IA: 0.41 IV
HCV AB SERPL QL IA: NEGATIVE

## 2023-03-28 ENCOUNTER — TELEMEDICINE (OUTPATIENT)
Dept: PSYCHOLOGY | Age: 53
End: 2023-03-28
Payer: COMMERCIAL

## 2023-03-28 DIAGNOSIS — F41.9 ANXIETY: Primary | ICD-10-CM

## 2023-03-28 PROCEDURE — 90832 PSYTX W PT 30 MINUTES: CPT | Performed by: PSYCHOLOGIST

## 2023-03-28 NOTE — PROGRESS NOTES
[phone]  Work Phone: [phone]  Relation: Brother/Sister    Provider location: [place], [address]:  Pt is doing well. She had a sore shoulder yesterday and took care of herself; feeling better today. She shared about her efforts to support her family and also set appropriate boundaries. Pt is noticing her mother's memory seems to be worsening. Pt is handling it as well as she can. O:  Interventions:  -Supportive techniques  -Processed experiences / stressors / concerns  -Reinforced efforts towards self-care  -Explored family dynamics  -Highlighted areas of progress      A:  MSE:  Appearance: good hygiene  and appropriate attire  Attitude: cooperative and friendly  Consciousness: alert  Orientation: oriented to person, place, time, general circumstance  Memory: recent and remote memory intact  Attention/Concentration: intact during session  Psychomotor Activity: normal  Eye Contact: normal  Speech: normal rate and volume, well-articulated  Mood: euthymic  Affect: congruent  Perception: within normal limits  Thought Content: within normal limits  Thought Process: logical, coherent and goal-directed  Insight: good  Judgment: intact  Ability to understand instructions: Yes  Ability to respond meaningfully: Yes  Morbid Ideation: no   Suicide Assessment: no suicidal ideation, plan, or intent. Appropriate for outpatient / telehealth care at this time. Homicidal Ideation: no        PHQ Scores 1/10/2023 12/14/2022 9/20/2022 10/11/2021 5/6/2021 3/9/2021 2/23/2021   PHQ2 Score 0 0 0 0 0 0 0   PHQ9 Score 0 0 0 0 0 0 0     Interpretation of Total Score Depression Severity: 1-4 = Minimal depression, 5-9 = Mild depression, 10-14 = Moderate depression, 15-19 = Moderately severe depression, 20-27 = Severe depression    Diagnosis:    1.  Anxiety          Patient Active Problem List   Diagnosis    Dermoid cyst of neck    Tinea corporis    Lateral epicondylitis of left elbow    Hidradenitis suppurativa

## 2023-04-06 PROBLEM — Z12.4 SCREENING FOR CERVICAL CANCER: Status: RESOLVED | Noted: 2023-03-07 | Resolved: 2023-04-06

## 2023-04-13 ENCOUNTER — TELEPHONE (OUTPATIENT)
Dept: FAMILY MEDICINE CLINIC | Age: 53
End: 2023-04-13

## 2023-04-18 ENCOUNTER — TELEMEDICINE (OUTPATIENT)
Dept: PSYCHOLOGY | Age: 53
End: 2023-04-18
Payer: COMMERCIAL

## 2023-04-18 DIAGNOSIS — F41.9 ANXIETY: Primary | ICD-10-CM

## 2023-04-18 PROCEDURE — 90832 PSYTX W PT 30 MINUTES: CPT | Performed by: PSYCHOLOGIST

## 2023-04-18 NOTE — PROGRESS NOTES
Behavioral Health Consultation  Mela Arias Psy.D. Psychologist  4/18/2023  11-11:30 AM EST      Time spent with Patient: 30 minutes  This is patient's third [facility] appointment. Reason for Consult: Stress management  Referring Provider: SILVIA Mancia CNP  [address] [facility],First [facility] [address]    TELEHEALTH VISIT -- Audio/Visual (During HPRPF-13 public health emergency)    Olga Lidia Lewis was evaluated through a synchronous (real-time) audio-video encounter using HIPAA-compliant technology. The patient (or guardian, if applicable) is aware that this is a billable service, which includes applicable co-pays. This Virtual Visit was conducted with patient's (and/or legal guardian's) consent. The visit was conducted pursuant to the emergency declaration under the [address] [place] [address], [address] [place] [address] authority and the [employer] and CARD[employer] General Act. Patient identification was verified, and a caregiver was present when appropriate. The patient was located in a state where the provider was licensed to provide care. Conducted a risk-benefit analysis and determined that the patient's presenting problems are consistent with the use of telepsychology. Determined that the patient has sufficient knowledge and skills in the use of technology enabling them to adequately benefit from telepsychology. It was determined that this patient was able to be properly treated without an in-person session. Patient verified current location at the beginning of the visit.     Verified the following information:  Patient's identification: Yes  Patient location: [address] [place] [address]  Patient's call back number: [phone]  Patient's emergency contact's name and number, as well as permission to contact them if needed:  Extended Emergency Contact Information  Primary Emergency Contact: [place] Phone:

## 2023-05-30 ENCOUNTER — CLINICAL DOCUMENTATION (OUTPATIENT)
Dept: PSYCHOLOGY | Age: 53
End: 2023-05-30

## 2023-07-25 ENCOUNTER — OFFICE VISIT (OUTPATIENT)
Dept: FAMILY MEDICINE CLINIC | Age: 53
End: 2023-07-25
Payer: COMMERCIAL

## 2023-07-25 VITALS
HEIGHT: 62 IN | OXYGEN SATURATION: 94 % | RESPIRATION RATE: 18 BRPM | DIASTOLIC BLOOD PRESSURE: 88 MMHG | SYSTOLIC BLOOD PRESSURE: 160 MMHG | WEIGHT: 185.4 LBS | BODY MASS INDEX: 34.12 KG/M2 | HEART RATE: 127 BPM

## 2023-07-25 DIAGNOSIS — R76.8 HEPATITIS C ANTIBODY TEST POSITIVE: ICD-10-CM

## 2023-07-25 DIAGNOSIS — I10 PRIMARY HYPERTENSION: ICD-10-CM

## 2023-07-25 DIAGNOSIS — L73.2 HIDRADENITIS SUPPURATIVA: ICD-10-CM

## 2023-07-25 DIAGNOSIS — M25.511 ACUTE PAIN OF RIGHT SHOULDER: ICD-10-CM

## 2023-07-25 DIAGNOSIS — R49.9 CHANGE IN VOICE: Primary | ICD-10-CM

## 2023-07-25 PROCEDURE — 3017F COLORECTAL CA SCREEN DOC REV: CPT | Performed by: NURSE PRACTITIONER

## 2023-07-25 PROCEDURE — 99214 OFFICE O/P EST MOD 30 MIN: CPT | Performed by: NURSE PRACTITIONER

## 2023-07-25 PROCEDURE — G8417 CALC BMI ABV UP PARAM F/U: HCPCS | Performed by: NURSE PRACTITIONER

## 2023-07-25 PROCEDURE — G8427 DOCREV CUR MEDS BY ELIG CLIN: HCPCS | Performed by: NURSE PRACTITIONER

## 2023-07-25 PROCEDURE — 3079F DIAST BP 80-89 MM HG: CPT | Performed by: NURSE PRACTITIONER

## 2023-07-25 PROCEDURE — 1036F TOBACCO NON-USER: CPT | Performed by: NURSE PRACTITIONER

## 2023-07-25 PROCEDURE — 3077F SYST BP >= 140 MM HG: CPT | Performed by: NURSE PRACTITIONER

## 2023-07-25 RX ORDER — LISINOPRIL 10 MG/1
10 TABLET ORAL DAILY
Qty: 30 TABLET | Refills: 5 | Status: SHIPPED | OUTPATIENT
Start: 2023-07-25

## 2023-07-25 RX ORDER — TRAMADOL HYDROCHLORIDE 50 MG/1
50 TABLET ORAL EVERY 6 HOURS PRN
Qty: 28 TABLET | Refills: 0 | Status: SHIPPED | OUTPATIENT
Start: 2023-07-25 | End: 2023-08-01

## 2023-07-25 RX ORDER — CLINDAMYCIN HYDROCHLORIDE 300 MG/1
300 CAPSULE ORAL 3 TIMES DAILY
Qty: 21 CAPSULE | Refills: 0 | Status: SHIPPED | OUTPATIENT
Start: 2023-07-25 | End: 2023-08-01

## 2023-07-25 ASSESSMENT — ENCOUNTER SYMPTOMS
TROUBLE SWALLOWING: 0
CONSTIPATION: 1
WHEEZING: 0
VOICE CHANGE: 1
SHORTNESS OF BREATH: 1
COUGH: 1

## 2023-07-25 NOTE — PROGRESS NOTES
Dixie Castellon (:  1970) is a 45 y.o. female,Established patient, here for evaluation of the following chief complaint(s):  Neck Pain (Since Saturday )      ASSESSMENT/PLAN:  1. Change in voice  -     Danial Townsend MD, Otolaryngology, [facility]  2. Acute pain of right shoulder  -     diclofenac sodium (VOLTAREN) 1 % GEL; Apply 4 g topically 4 times daily, Topical, 4 TIMES DAILY Starting 2023, Disp-100 g, R-0, Normal  -     BEL; Future  -     C-Reactive Protein; Future  -     Cyclic Citrul Peptide Antibody, IgG; Future  -     Rheumatoid Factor; Future  -     Sedimentation Rate; Future  -     traMADol (ULTRAM) 50 MG tablet; Take 1 tablet by mouth every 6 hours as needed for Pain for up to 7 days. Intended supply: 7 days. Take lowest dose possible to manage pain Max Daily Amount: 200 mg, Disp-28 tablet, R-0Normal  3. Primary hypertension  -     lisinopril (PRINIVIL;ZESTRIL) 10 MG tablet; Take 1 tablet by mouth daily, Disp-30 tablet, R-5Normal  4. Hidradenitis suppurativa  -     clindamycin (CLEOCIN) 300 MG capsule; Take 1 capsule by mouth 3 times daily for 7 days, Disp-21 capsule, R-0Normal  5. Hepatitis C antibody test positive  -     Hepatitis C Antibody; Future      No follow-ups on file. SUBJECTIVE/OBJECTIVE:  Esophagus feels irritated. Cough feels in right shoulder. Cough is periodic. Raspy voice and she likes to sing and she feels like she can't sing. Never been a smoker. Constipation- drinks tea. Used to be very regular. On and off constipation over the past two years. Occasionally heart burn. Breakouts underarms and clindamycin helps. She needs a rx. Monitors BP at home today 160/103. Denies headaches, chest pain, or shortness of breath.       Current Outpatient Medications   Medication Sig Dispense Refill    diclofenac sodium (VOLTAREN) 1 % GEL Apply 4 g topically 4 times daily 100 g 0    clindamycin (CLEOCIN) 300 MG capsule Take 1 capsule by mouth

## 2023-07-26 LAB
ANA SER QL IA: NEGATIVE
CCP IGG SERPL-ACNC: <0.5 U/ML (ref 0–2.9)
CRP SERPL-MCNC: 14.2 MG/L (ref 0–5.1)
ERYTHROCYTE [SEDIMENTATION RATE] IN BLOOD BY WESTERGREN METHOD: 53 MM/HR (ref 0–30)
HCV AB SERPL QL IA: REACTIVE
RHEUMATOID FACT SER IA-ACNC: <10 IU/ML

## 2023-08-08 ENCOUNTER — OFFICE VISIT (OUTPATIENT)
Dept: ENT CLINIC | Age: 53
End: 2023-08-08
Payer: COMMERCIAL

## 2023-08-08 ENCOUNTER — TELEMEDICINE (OUTPATIENT)
Dept: FAMILY MEDICINE CLINIC | Age: 53
End: 2023-08-08
Payer: COMMERCIAL

## 2023-08-08 ENCOUNTER — TELEPHONE (OUTPATIENT)
Dept: FAMILY MEDICINE CLINIC | Age: 53
End: 2023-08-08

## 2023-08-08 VITALS
TEMPERATURE: 98.4 F | DIASTOLIC BLOOD PRESSURE: 88 MMHG | HEART RATE: 81 BPM | HEIGHT: 62 IN | BODY MASS INDEX: 34.48 KG/M2 | WEIGHT: 187.4 LBS | SYSTOLIC BLOOD PRESSURE: 184 MMHG

## 2023-08-08 DIAGNOSIS — R49.9 HOARSENESS OR CHANGING VOICE: Primary | ICD-10-CM

## 2023-08-08 DIAGNOSIS — I10 PRIMARY HYPERTENSION: ICD-10-CM

## 2023-08-08 DIAGNOSIS — J38.1 VOCAL CORD POLYPS: ICD-10-CM

## 2023-08-08 PROBLEM — R03.0 ELEVATED BP WITHOUT DIAGNOSIS OF HYPERTENSION: Status: RESOLVED | Noted: 2021-03-09 | Resolved: 2023-08-08

## 2023-08-08 PROCEDURE — 3017F COLORECTAL CA SCREEN DOC REV: CPT | Performed by: NURSE PRACTITIONER

## 2023-08-08 PROCEDURE — 31575 DIAGNOSTIC LARYNGOSCOPY: CPT | Performed by: OTOLARYNGOLOGY

## 2023-08-08 PROCEDURE — 1036F TOBACCO NON-USER: CPT | Performed by: OTOLARYNGOLOGY

## 2023-08-08 PROCEDURE — G8427 DOCREV CUR MEDS BY ELIG CLIN: HCPCS | Performed by: NURSE PRACTITIONER

## 2023-08-08 PROCEDURE — 3017F COLORECTAL CA SCREEN DOC REV: CPT | Performed by: OTOLARYNGOLOGY

## 2023-08-08 PROCEDURE — G8417 CALC BMI ABV UP PARAM F/U: HCPCS | Performed by: OTOLARYNGOLOGY

## 2023-08-08 PROCEDURE — 99204 OFFICE O/P NEW MOD 45 MIN: CPT | Performed by: OTOLARYNGOLOGY

## 2023-08-08 PROCEDURE — 3077F SYST BP >= 140 MM HG: CPT | Performed by: OTOLARYNGOLOGY

## 2023-08-08 PROCEDURE — 3079F DIAST BP 80-89 MM HG: CPT | Performed by: OTOLARYNGOLOGY

## 2023-08-08 PROCEDURE — G8427 DOCREV CUR MEDS BY ELIG CLIN: HCPCS | Performed by: OTOLARYNGOLOGY

## 2023-08-08 PROCEDURE — 99214 OFFICE O/P EST MOD 30 MIN: CPT | Performed by: NURSE PRACTITIONER

## 2023-08-08 SDOH — ECONOMIC STABILITY: HOUSING INSECURITY
IN THE LAST 12 MONTHS, WAS THERE A TIME WHEN YOU DID NOT HAVE A STEADY PLACE TO SLEEP OR SLEPT IN A SHELTER (INCLUDING NOW)?: NO

## 2023-08-08 SDOH — ECONOMIC STABILITY: FOOD INSECURITY: WITHIN THE PAST 12 MONTHS, YOU WORRIED THAT YOUR FOOD WOULD RUN OUT BEFORE YOU GOT MONEY TO BUY MORE.: NEVER TRUE

## 2023-08-08 SDOH — ECONOMIC STABILITY: INCOME INSECURITY: HOW HARD IS IT FOR YOU TO PAY FOR THE VERY BASICS LIKE FOOD, HOUSING, MEDICAL CARE, AND HEATING?: NOT HARD AT ALL

## 2023-08-08 SDOH — ECONOMIC STABILITY: FOOD INSECURITY: WITHIN THE PAST 12 MONTHS, THE FOOD YOU BOUGHT JUST DIDN'T LAST AND YOU DIDN'T HAVE MONEY TO GET MORE.: NEVER TRUE

## 2023-08-08 ASSESSMENT — ENCOUNTER SYMPTOMS
SHORTNESS OF BREATH: 0
ABDOMINAL PAIN: 0
CHEST TIGHTNESS: 0
NAUSEA: 0
COLOR CHANGE: 0
WHEEZING: 0
EYE REDNESS: 0
BLOOD IN STOOL: 0
SINUS PRESSURE: 0
RHINORRHEA: 0
COUGH: 0
CONSTIPATION: 0
DIARRHEA: 0
EYE ITCHING: 0
SORE THROAT: 0
VOMITING: 0
BACK PAIN: 0

## 2023-08-08 NOTE — ASSESSMENT & PLAN NOTE
Patient with marked elevated BP over course of time without improvement on lisinopril.  Will increase her to 20 mg daily and have her follow up in 2-3 weeks again with charbel.

## 2023-08-08 NOTE — TELEPHONE ENCOUNTER
Patient wasn't able to stop in after her other appt because it ran over and she had another commitment. Please check the BP numbers from the ENT and if you still want her to come in tomorrow to take her BP please call her to let her know.     [phone] (home)

## 2023-08-08 NOTE — PROGRESS NOTES
PALPATION:  No tenderness over sinuses. Zygomatic arches and orbital rims intact  OTOSCOPY:  Normal external auditory canals, tympanic membranes, and middle ear spaces  TUNING FORKS: Rinne ++ Odom midline at 512 Hz  INTRANASAL:  Septum midline, turbinates normal, meati clear. LIPS, TEETH, GINGIVA:  Normal mucosa  PHARYNX:  Normal  NECK:  No masses. LYMPHATIC:  No cervical adenopathy  SALIVARY GLANDS:  No swelling or masses in the parotid or submandibular salivary glands  THYROID:  No goiter or thyroid masses. As the patient has symptoms suggestive of disease in the larynx or hypopharynx, fiberoptic laryngoscopy is performed. FIBEROPTIC LARYNGOSCOPY:  Nares topically anaesthetized with lidocaine spray. Fiberoptic scope passed per naris into nasopharynx and hypopharyrnx and larynx visualized. Normal tongue base                                                          Normal epiglottis                                                          Vocal polyps, bilateral, the left is larger than the right. Vocal cord mobility is normal.                                                          Normal pyriform sinuses   IMPRESSION: Hoarseness due to vocal polyps. PLAN: Discussed vocal polyps with patient. Will arrange for microlaryngoscopy and removal of polyps in the operating room.     FOLLOW-UP: At surgery

## 2023-08-08 NOTE — PROGRESS NOTES
Hector Miles (:  1970) is a Established patient, presenting virtually for evaluation of the following:    Assessment & Plan   Below is the assessment and plan developed based on review of pertinent history, physical exam, labs, studies, and medications. 1. Primary hypertension  Assessment & Plan:   Patient with marked elevated BP over course of time without improvement on lisinopril. Will increase her to 20 mg daily and have her follow up in 2-3 weeks again with eval.     No follow-ups on file. Subjective   HPI  Hypertension: The patient is here for follow-up of elevated blood pressure. She is not exercising and is adherent to low salt diet. Blood pressure is not well controlled at home. Cardiac symptoms none. Patient denies chest pain and chest pressure/discomfort. Cardiovascular risk factors: hypertension. She is not experiencing any side effects related to the medication. She is checking her BP at home and states that before medication is is 130/90s, but after taking her medication, it is better. 192/108 is her blood pressure at this time. She has been tracking and the pressures have been     Review of Systems   Constitutional:  Negative for chills, fatigue and fever. HENT:  Negative for congestion, ear pain, postnasal drip, rhinorrhea, sinus pressure, sneezing and sore throat. Eyes:  Negative for redness and itching. Respiratory:  Negative for cough, chest tightness, shortness of breath and wheezing. Cardiovascular:  Negative for chest pain and palpitations. Gastrointestinal:  Negative for abdominal pain, blood in stool, constipation, diarrhea, nausea and vomiting. Endocrine: Negative for cold intolerance and heat intolerance. Genitourinary:  Negative for difficulty urinating, dysuria, flank pain, frequency, hematuria and urgency. Musculoskeletal:  Negative for arthralgias, back pain, joint swelling and myalgias. Skin:  Negative for color change, pallor, rash and wound.

## 2023-08-14 DIAGNOSIS — I10 PRIMARY HYPERTENSION: ICD-10-CM

## 2023-08-14 RX ORDER — LISINOPRIL 20 MG/1
20 TABLET ORAL DAILY
Qty: 30 TABLET | Refills: 5 | Status: SHIPPED | OUTPATIENT
Start: 2023-08-14

## 2023-08-14 NOTE — TELEPHONE ENCOUNTER
Patient would like her lisinopril (PRINIVIL;ZESTRIL) 10 MG tablet changed to a 20 MG tablet. Arnaldo Shelton doubled her script dosage.  [facility] said they would like a new script sent to [id number], [address] [place] [address] [phone]   [address] [place] [address]., [place] [address]   Phone:  [phone]  Fax:  [phone]

## 2023-08-17 ENCOUNTER — APPOINTMENT (OUTPATIENT)
Dept: CT IMAGING | Age: 53
End: 2023-08-17
Payer: COMMERCIAL

## 2023-08-17 ENCOUNTER — TELEPHONE (OUTPATIENT)
Dept: FAMILY MEDICINE CLINIC | Age: 53
End: 2023-08-17

## 2023-08-17 ENCOUNTER — HOSPITAL ENCOUNTER (EMERGENCY)
Age: 53
Discharge: HOME | End: 2023-08-17
Attending: EMERGENCY MEDICINE
Payer: COMMERCIAL

## 2023-08-17 VITALS
SYSTOLIC BLOOD PRESSURE: 175 MMHG | RESPIRATION RATE: 15 BRPM | WEIGHT: 182.9 LBS | OXYGEN SATURATION: 94 % | HEART RATE: 64 BPM | HEIGHT: 62 IN | TEMPERATURE: 98.9 F | DIASTOLIC BLOOD PRESSURE: 90 MMHG | BODY MASS INDEX: 33.66 KG/M2

## 2023-08-17 DIAGNOSIS — D32.9 MENINGIOMA (HCC): ICD-10-CM

## 2023-08-17 DIAGNOSIS — I10 UNCONTROLLED HYPERTENSION: Primary | ICD-10-CM

## 2023-08-17 DIAGNOSIS — R42 VERTIGO: ICD-10-CM

## 2023-08-17 LAB
ANION GAP SERPL CALCULATED.3IONS-SCNC: 11 MMOL/L (ref 3–16)
BASOPHILS # BLD: 0.1 K/UL (ref 0–0.2)
BASOPHILS NFR BLD: 0.6 %
BUN SERPL-MCNC: 9 MG/DL (ref 7–20)
CALCIUM SERPL-MCNC: 9.8 MG/DL (ref 8.3–10.6)
CHLORIDE SERPL-SCNC: 106 MMOL/L (ref 99–110)
CO2 SERPL-SCNC: 25 MMOL/L (ref 21–32)
CREAT SERPL-MCNC: 0.7 MG/DL (ref 0.6–1.1)
DEPRECATED RDW RBC AUTO: 14.8 % (ref 12.4–15.4)
EOSINOPHIL # BLD: 0.2 K/UL (ref 0–0.6)
EOSINOPHIL NFR BLD: 1.9 %
GFR SERPLBLD CREATININE-BSD FMLA CKD-EPI: >60 ML/MIN/{1.73_M2}
GLUCOSE SERPL-MCNC: 88 MG/DL (ref 70–99)
HCT VFR BLD AUTO: 37.7 % (ref 36–48)
HGB BLD-MCNC: 12.5 G/DL (ref 12–16)
LYMPHOCYTES # BLD: 4.1 K/UL (ref 1–5.1)
LYMPHOCYTES NFR BLD: 42.3 %
MCH RBC QN AUTO: 29.8 PG (ref 26–34)
MCHC RBC AUTO-ENTMCNC: 33 G/DL (ref 31–36)
MCV RBC AUTO: 90.3 FL (ref 80–100)
MONOCYTES # BLD: 0.7 K/UL (ref 0–1.3)
MONOCYTES NFR BLD: 6.9 %
NEUTROPHILS # BLD: 4.7 K/UL (ref 1.7–7.7)
NEUTROPHILS NFR BLD: 48.3 %
PLATELET # BLD AUTO: 258 K/UL (ref 135–450)
PMV BLD AUTO: 7.4 FL (ref 5–10.5)
POTASSIUM SERPL-SCNC: 3.6 MMOL/L (ref 3.5–5.1)
RBC # BLD AUTO: 4.18 M/UL (ref 4–5.2)
SODIUM SERPL-SCNC: 142 MMOL/L (ref 136–145)
TROPONIN, HIGH SENSITIVITY: <6 NG/L (ref 0–14)
WBC # BLD AUTO: 9.7 K/UL (ref 4–11)

## 2023-08-17 PROCEDURE — 6360000002 HC RX W HCPCS: Performed by: EMERGENCY MEDICINE

## 2023-08-17 PROCEDURE — 85025 COMPLETE CBC W/AUTO DIFF WBC: CPT

## 2023-08-17 PROCEDURE — 93005 ELECTROCARDIOGRAM TRACING: CPT | Performed by: EMERGENCY MEDICINE

## 2023-08-17 PROCEDURE — 70450 CT HEAD/BRAIN W/O DYE: CPT

## 2023-08-17 PROCEDURE — 2580000003 HC RX 258: Performed by: EMERGENCY MEDICINE

## 2023-08-17 PROCEDURE — 6370000000 HC RX 637 (ALT 250 FOR IP): Performed by: EMERGENCY MEDICINE

## 2023-08-17 PROCEDURE — 99285 EMERGENCY DEPT VISIT HI MDM: CPT

## 2023-08-17 PROCEDURE — 96374 THER/PROPH/DIAG INJ IV PUSH: CPT

## 2023-08-17 PROCEDURE — 84484 ASSAY OF TROPONIN QUANT: CPT

## 2023-08-17 PROCEDURE — 6360000004 HC RX CONTRAST MEDICATION: Performed by: EMERGENCY MEDICINE

## 2023-08-17 PROCEDURE — 70498 CT ANGIOGRAPHY NECK: CPT

## 2023-08-17 PROCEDURE — 96375 TX/PRO/DX INJ NEW DRUG ADDON: CPT

## 2023-08-17 PROCEDURE — 80048 BASIC METABOLIC PNL TOTAL CA: CPT

## 2023-08-17 RX ORDER — DIPHENHYDRAMINE HYDROCHLORIDE 50 MG/ML
25 INJECTION INTRAMUSCULAR; INTRAVENOUS ONCE
Status: COMPLETED | OUTPATIENT
Start: 2023-08-17 | End: 2023-08-17

## 2023-08-17 RX ORDER — AMLODIPINE BESYLATE 5 MG/1
5 TABLET ORAL DAILY
Qty: 30 TABLET | Refills: 0 | Status: SHIPPED | OUTPATIENT
Start: 2023-08-17 | End: 2023-08-22 | Stop reason: SDUPTHER

## 2023-08-17 RX ORDER — MECLIZINE HYDROCHLORIDE 25 MG/1
25 TABLET ORAL 3 TIMES DAILY PRN
Qty: 20 TABLET | Refills: 0 | Status: SHIPPED | OUTPATIENT
Start: 2023-08-17 | End: 2023-08-27

## 2023-08-17 RX ORDER — AMLODIPINE BESYLATE 5 MG/1
5 TABLET ORAL ONCE
Status: COMPLETED | OUTPATIENT
Start: 2023-08-17 | End: 2023-08-17

## 2023-08-17 RX ADMIN — DIPHENHYDRAMINE HYDROCHLORIDE 25 MG: 50 INJECTION, SOLUTION INTRAMUSCULAR; INTRAVENOUS at 21:21

## 2023-08-17 RX ADMIN — AMLODIPINE BESYLATE 5 MG: 5 TABLET ORAL at 20:52

## 2023-08-17 RX ADMIN — IOHEXOL 100 ML: 350 INJECTION, SOLUTION INTRAVENOUS at 21:28

## 2023-08-17 RX ADMIN — METHYLPREDNISOLONE SODIUM SUCCINATE 60 MG: 125 INJECTION INTRAMUSCULAR; INTRAVENOUS at 21:22

## 2023-08-17 NOTE — TELEPHONE ENCOUNTER
Bp reading 180/113(average per pt)    She want to know how long it was supposed to take to work or would she need an new medication. She been having episodes of Vertigo, pt stated she is supposed have surgery next month. She would like a call back from [employer].

## 2023-08-17 NOTE — TELEPHONE ENCOUNTER
Pt would like a call back regarding her blood pressure medication. She said medication is not working and her blood pressure is still high. She is experiencing Vertigo, and headaches! She would like a call back regarding what to do.

## 2023-08-17 NOTE — TELEPHONE ENCOUNTER
Increase lisinopril to 40 mg daily for one week, then follow up with pressures, if remains elevated, back in office.

## 2023-08-18 LAB
EKG ATRIAL RATE: 67 BPM
EKG DIAGNOSIS: NORMAL
EKG P AXIS: 56 DEGREES
EKG P-R INTERVAL: 150 MS
EKG Q-T INTERVAL: 418 MS
EKG QRS DURATION: 84 MS
EKG QTC CALCULATION (BAZETT): 441 MS
EKG R AXIS: -1 DEGREES
EKG T AXIS: 55 DEGREES
EKG VENTRICULAR RATE: 67 BPM

## 2023-08-18 PROCEDURE — 93010 ELECTROCARDIOGRAM REPORT: CPT | Performed by: INTERNAL MEDICINE

## 2023-08-18 NOTE — ED PROVIDER NOTES
[facility]  EMERGENCY DEPT VISIT      Patient Identification  Tavares Masters is a 45 y.o. female. Chief Complaint   Hypertension (Pt has had High BP for awhile now; has been working with her PMD to regulate and find the right medications. )      History of Present Illness:    History was obtained from patient. This is a  45 y.o. female who presents ambulatory  to the ED with complaints of high blood pressure. Patient has a h/o HTN and just started lisinopril a few weeks ago and her doctor has been titrating this medication higher trying to control it. She states she feels like her BP has just gotten worse. This week she has been having some off and on mild headaches as well as off and on dizziness which is described as spinning when she bends over, sometimes when she walks. It lasts 1 minute or less and she has not had trouble walking or required any assistance. No visual changes. No numbness or weakness. No trouble talking. She is not experiencing any dizziness at this time. No chest pain. No trouble breathing. BP got even higher today and she has already taken 2 lisinopril 20mg tablets. Past Medical History:   Diagnosis Date    Hypertension     Vertigo        Past Surgical History:   Procedure Laterality Date    CARPAL TUNNEL RELEASE Bilateral        No current facility-administered medications for this encounter.     Current Outpatient Medications:     amLODIPine (NORVASC) 5 MG tablet, Take 1 tablet by mouth daily, Disp: 30 tablet, Rfl: 0    meclizine (ANTIVERT) 25 MG tablet, Take 1 tablet by mouth 3 times daily as needed for Dizziness, Disp: 20 tablet, Rfl: 0    lisinopril (PRINIVIL;ZESTRIL) 20 MG tablet, Take 1 tablet by mouth daily, Disp: 30 tablet, Rfl: 5    Blood Pressure Monitoring (COMFORT TOUCH BP CUFF/LARGE) MISC, Check bp daily, Disp: 1 each, Rfl: 0    Allergies   Allergen Reactions    Shellfish-Derived Products Hives       Social History     Socioeconomic History    Marital status: Single

## 2023-08-18 NOTE — DISCHARGE INSTRUCTIONS
Continue to monitor your blood pressure and keep a log. Take lisinopril 20mg and norvasc 5mg. Followup with your doctor. Avoid salt in your diet and caffeine. Return for severe headache, increased or prolonged dizziness, visual changes, numbness or weakness, trouble talking, chest pain, trouble walking. Talk to your ENT specialist about your vertigo symptoms as well. See [facility] neurosurgery regarding abnormal findings on CT head including likely meningioma.

## 2023-08-22 ENCOUNTER — OFFICE VISIT (OUTPATIENT)
Dept: FAMILY MEDICINE CLINIC | Age: 53
End: 2023-08-22
Payer: COMMERCIAL

## 2023-08-22 ENCOUNTER — TELEPHONE (OUTPATIENT)
Dept: ENT CLINIC | Age: 53
End: 2023-08-22

## 2023-08-22 VITALS
TEMPERATURE: 98.6 F | SYSTOLIC BLOOD PRESSURE: 138 MMHG | OXYGEN SATURATION: 99 % | HEART RATE: 98 BPM | WEIGHT: 179 LBS | BODY MASS INDEX: 32.94 KG/M2 | DIASTOLIC BLOOD PRESSURE: 74 MMHG | HEIGHT: 62 IN

## 2023-08-22 DIAGNOSIS — I10 PRIMARY HYPERTENSION: ICD-10-CM

## 2023-08-22 PROCEDURE — G8417 CALC BMI ABV UP PARAM F/U: HCPCS | Performed by: NURSE PRACTITIONER

## 2023-08-22 PROCEDURE — 3075F SYST BP GE 130 - 139MM HG: CPT | Performed by: NURSE PRACTITIONER

## 2023-08-22 PROCEDURE — G8427 DOCREV CUR MEDS BY ELIG CLIN: HCPCS | Performed by: NURSE PRACTITIONER

## 2023-08-22 PROCEDURE — 3078F DIAST BP <80 MM HG: CPT | Performed by: NURSE PRACTITIONER

## 2023-08-22 PROCEDURE — 1036F TOBACCO NON-USER: CPT | Performed by: NURSE PRACTITIONER

## 2023-08-22 PROCEDURE — 3017F COLORECTAL CA SCREEN DOC REV: CPT | Performed by: NURSE PRACTITIONER

## 2023-08-22 PROCEDURE — 99214 OFFICE O/P EST MOD 30 MIN: CPT | Performed by: NURSE PRACTITIONER

## 2023-08-22 RX ORDER — AMLODIPINE BESYLATE 5 MG/1
5 TABLET ORAL DAILY
Qty: 90 TABLET | Refills: 0 | Status: SHIPPED | OUTPATIENT
Start: 2023-08-22 | End: 2023-11-20

## 2023-08-22 ASSESSMENT — ENCOUNTER SYMPTOMS
EYE REDNESS: 0
CHEST TIGHTNESS: 0
VOMITING: 0
BACK PAIN: 0
SINUS PRESSURE: 0
SHORTNESS OF BREATH: 0
SORE THROAT: 0
EYE ITCHING: 0
COUGH: 0
ABDOMINAL PAIN: 0
CONSTIPATION: 0
BLOOD IN STOOL: 0
RHINORRHEA: 0
DIARRHEA: 0
COLOR CHANGE: 0
WHEEZING: 0
NAUSEA: 0

## 2023-08-22 NOTE — PROGRESS NOTES
Alison Manning (:  1970) is a 45 y.o. female,Established patient, here for evaluation of the following chief complaint(s):  Follow-up      ASSESSMENT/PLAN:  1. Primary hypertension  Assessment & Plan:   Improved  Continue lisinopril 20 mg   Continue amlodipine 5 mg   Follow up 3 months        No follow-ups on file. SUBJECTIVE/OBJECTIVE:    Patient in the office for follow up of HTN. She was recently only only lisinopril 20 mg, but had a pressure that was up to 200/127, so she went to the ED. They did work up and started her on amlodipine. She now has has better control of her BP. She is feeling better and no longer having issues with her BP. Current Outpatient Medications   Medication Sig Dispense Refill    amLODIPine (NORVASC) 5 MG tablet Take 1 tablet by mouth daily 90 tablet 0    meclizine (ANTIVERT) 25 MG tablet Take 1 tablet by mouth 3 times daily as needed for Dizziness 20 tablet 0    lisinopril (PRINIVIL;ZESTRIL) 20 MG tablet Take 1 tablet by mouth daily 30 tablet 5    Blood Pressure Monitoring (COMFORT TOUCH BP CUFF/LARGE) MISC Check bp daily 1 each 0     No current facility-administered medications for this visit. Review of Systems   Constitutional:  Negative for chills, fatigue and fever. HENT:  Negative for congestion, ear pain, postnasal drip, rhinorrhea, sinus pressure, sneezing and sore throat. Eyes:  Negative for redness and itching. Respiratory:  Negative for cough, chest tightness, shortness of breath and wheezing. Cardiovascular:  Negative for chest pain and palpitations. Gastrointestinal:  Negative for abdominal pain, blood in stool, constipation, diarrhea, nausea and vomiting. Endocrine: Negative for cold intolerance and heat intolerance. Genitourinary:  Negative for difficulty urinating, dysuria, flank pain, frequency, hematuria and urgency. Musculoskeletal:  Negative for arthralgias, back pain, joint swelling and myalgias.    Skin:  Negative for color

## 2023-08-22 NOTE — TELEPHONE ENCOUNTER
Patient wants to cancel surgery due to some personal issues she is having. She will call back to reschedule appointment.   amy

## 2023-09-12 DIAGNOSIS — I10 PRIMARY HYPERTENSION: ICD-10-CM

## 2023-09-12 RX ORDER — LISINOPRIL 20 MG/1
20 TABLET ORAL DAILY
Qty: 30 TABLET | Refills: 5 | Status: SHIPPED | OUTPATIENT
Start: 2023-09-12

## 2023-12-04 DIAGNOSIS — Z12.31 ENCOUNTER FOR SCREENING MAMMOGRAM FOR MALIGNANT NEOPLASM OF BREAST: Primary | ICD-10-CM

## 2024-01-09 ENCOUNTER — OFFICE VISIT (OUTPATIENT)
Dept: FAMILY MEDICINE CLINIC | Age: 54
End: 2024-01-09
Payer: COMMERCIAL

## 2024-01-09 VITALS
BODY MASS INDEX: 33.47 KG/M2 | TEMPERATURE: 97.8 F | SYSTOLIC BLOOD PRESSURE: 132 MMHG | OXYGEN SATURATION: 97 % | DIASTOLIC BLOOD PRESSURE: 76 MMHG | HEART RATE: 87 BPM | WEIGHT: 183 LBS

## 2024-01-09 DIAGNOSIS — I10 PRIMARY HYPERTENSION: Primary | ICD-10-CM

## 2024-01-09 PROCEDURE — 3075F SYST BP GE 130 - 139MM HG: CPT | Performed by: NURSE PRACTITIONER

## 2024-01-09 PROCEDURE — G8484 FLU IMMUNIZE NO ADMIN: HCPCS | Performed by: NURSE PRACTITIONER

## 2024-01-09 PROCEDURE — 3017F COLORECTAL CA SCREEN DOC REV: CPT | Performed by: NURSE PRACTITIONER

## 2024-01-09 PROCEDURE — 3078F DIAST BP <80 MM HG: CPT | Performed by: NURSE PRACTITIONER

## 2024-01-09 PROCEDURE — G8417 CALC BMI ABV UP PARAM F/U: HCPCS | Performed by: NURSE PRACTITIONER

## 2024-01-09 PROCEDURE — 99213 OFFICE O/P EST LOW 20 MIN: CPT | Performed by: NURSE PRACTITIONER

## 2024-01-09 PROCEDURE — 1036F TOBACCO NON-USER: CPT | Performed by: NURSE PRACTITIONER

## 2024-01-09 PROCEDURE — G8427 DOCREV CUR MEDS BY ELIG CLIN: HCPCS | Performed by: NURSE PRACTITIONER

## 2024-01-09 RX ORDER — LOSARTAN POTASSIUM 25 MG/1
25 TABLET ORAL DAILY
Qty: 90 TABLET | Refills: 1 | Status: SHIPPED | OUTPATIENT
Start: 2024-01-09

## 2024-01-09 ASSESSMENT — ENCOUNTER SYMPTOMS
SINUS PRESSURE: 0
COUGH: 0
NAUSEA: 0
CONSTIPATION: 0
DIARRHEA: 0
EYE ITCHING: 0
WHEEZING: 0
ABDOMINAL PAIN: 0
RHINORRHEA: 0
VOMITING: 0
CHEST TIGHTNESS: 0
BACK PAIN: 0
EYE REDNESS: 0
COLOR CHANGE: 0
SHORTNESS OF BREATH: 0
BLOOD IN STOOL: 0
SORE THROAT: 0

## 2024-01-09 ASSESSMENT — PATIENT HEALTH QUESTIONNAIRE - PHQ9
SUM OF ALL RESPONSES TO PHQ QUESTIONS 1-9: 0
SUM OF ALL RESPONSES TO PHQ9 QUESTIONS 1 & 2: 0
SUM OF ALL RESPONSES TO PHQ QUESTIONS 1-9: 0
2. FEELING DOWN, DEPRESSED OR HOPELESS: 0
1. LITTLE INTEREST OR PLEASURE IN DOING THINGS: 0

## 2024-01-09 NOTE — PROGRESS NOTES
abdominal pain, blood in stool, constipation, diarrhea, nausea and vomiting.   Endocrine: Negative for cold intolerance and heat intolerance.   Genitourinary:  Negative for difficulty urinating, dysuria, flank pain, frequency, hematuria and urgency.   Musculoskeletal:  Negative for arthralgias, back pain, joint swelling and myalgias.   Skin:  Negative for color change, pallor, rash and wound.   Allergic/Immunologic: Negative for environmental allergies and food allergies.   Neurological:  Negative for dizziness, seizures, syncope, weakness, light-headedness, numbness and headaches.   Hematological:  Negative for adenopathy. Does not bruise/bleed easily.   Psychiatric/Behavioral:  Negative for confusion, sleep disturbance and suicidal ideas. The patient is not nervous/anxious and is not hyperactive.        Vitals:    01/09/24 1539   BP: 132/76   Pulse: 87   Temp: 97.8 °F (36.6 °C)   SpO2: 97%   Weight: 83 kg (183 lb)       Physical Exam  Constitutional:       Appearance: Normal appearance. She is well-developed.   HENT:      Head: Normocephalic and atraumatic.      Right Ear: Hearing normal.      Left Ear: Hearing normal.      Nose: No mucosal edema.      Right Sinus: No maxillary sinus tenderness or frontal sinus tenderness.      Left Sinus: No maxillary sinus tenderness or frontal sinus tenderness.      Mouth/Throat:      Tonsils: No tonsillar abscesses.   Eyes:      Extraocular Movements: Extraocular movements intact.      Pupils: Pupils are equal, round, and reactive to light.   Cardiovascular:      Rate and Rhythm: Normal rate.      Pulses: Normal pulses.   Pulmonary:      Effort: Pulmonary effort is normal.   Lymphadenopathy:      Head:      Right side of head: No submental, submandibular, tonsillar, preauricular, posterior auricular or occipital adenopathy.      Left side of head: No submental, submandibular, tonsillar, preauricular, posterior auricular or occipital adenopathy.   Skin:     General: Skin is

## 2024-01-09 NOTE — ASSESSMENT & PLAN NOTE
Blood pressure notably improved with addition of lisinopril 20 mg daily. However, pt experiencing adverse effect of cough. Lisinopril discontinued, ARB Losartan 25 mg daily prescribed. Continue Amlodipine 5 mg daily.   Pt educated to stop taking lisinopril and then begin taking  Losartan.   Pt advised to follow-up with office for any concerns.

## 2024-01-23 ENCOUNTER — TELEPHONE (OUTPATIENT)
Dept: FAMILY MEDICINE CLINIC | Age: 54
End: 2024-01-23

## 2024-01-23 RX ORDER — AMLODIPINE BESYLATE 5 MG/1
5 TABLET ORAL DAILY
Qty: 90 TABLET | Refills: 0 | Status: SHIPPED | OUTPATIENT
Start: 2024-01-23

## 2024-01-23 NOTE — TELEPHONE ENCOUNTER
amLODIPine (NORVASC) 5 MG tablet [[id number]]    Order Details  Dose: 5 mg Route: Oral Frequency: DAILY   Dispense Quantity: 90 tablet Refills: 0          Sig: TAKE 1 TABLET BY MOUTH EVERY DAY         [id number] [address] - P [phone] - F [phone] [[id number]]     1/9/24 7/25/23

## 2024-02-19 ENCOUNTER — OFFICE VISIT (OUTPATIENT)
Dept: FAMILY MEDICINE CLINIC | Age: 54
End: 2024-02-19
Payer: COMMERCIAL

## 2024-02-19 VITALS
HEART RATE: 100 BPM | BODY MASS INDEX: 34.02 KG/M2 | OXYGEN SATURATION: 98 % | WEIGHT: 186 LBS | SYSTOLIC BLOOD PRESSURE: 120 MMHG | DIASTOLIC BLOOD PRESSURE: 82 MMHG | TEMPERATURE: 97.1 F

## 2024-02-19 DIAGNOSIS — D32.9 MENINGIOMA (HCC): ICD-10-CM

## 2024-02-19 DIAGNOSIS — Z12.11 COLON CANCER SCREENING: Primary | ICD-10-CM

## 2024-02-19 DIAGNOSIS — J06.9 VIRAL URI: ICD-10-CM

## 2024-02-19 PROCEDURE — G8417 CALC BMI ABV UP PARAM F/U: HCPCS | Performed by: NURSE PRACTITIONER

## 2024-02-19 PROCEDURE — G8484 FLU IMMUNIZE NO ADMIN: HCPCS | Performed by: NURSE PRACTITIONER

## 2024-02-19 PROCEDURE — 3079F DIAST BP 80-89 MM HG: CPT | Performed by: NURSE PRACTITIONER

## 2024-02-19 PROCEDURE — 3074F SYST BP LT 130 MM HG: CPT | Performed by: NURSE PRACTITIONER

## 2024-02-19 PROCEDURE — 99214 OFFICE O/P EST MOD 30 MIN: CPT | Performed by: NURSE PRACTITIONER

## 2024-02-19 PROCEDURE — 3017F COLORECTAL CA SCREEN DOC REV: CPT | Performed by: NURSE PRACTITIONER

## 2024-02-19 PROCEDURE — G8427 DOCREV CUR MEDS BY ELIG CLIN: HCPCS | Performed by: NURSE PRACTITIONER

## 2024-02-19 PROCEDURE — 1036F TOBACCO NON-USER: CPT | Performed by: NURSE PRACTITIONER

## 2024-02-19 RX ORDER — PREDNISONE 20 MG/1
20 TABLET ORAL 2 TIMES DAILY
Qty: 10 TABLET | Refills: 0 | Status: SHIPPED | OUTPATIENT
Start: 2024-02-19 | End: 2024-02-24

## 2024-02-19 ASSESSMENT — ENCOUNTER SYMPTOMS
COUGH: 0
SHORTNESS OF BREATH: 0

## 2024-02-19 NOTE — ASSESSMENT & PLAN NOTE
PCOT flu test negative.   Antibiotic not indicated, discussed rationale of not giving antibiotics due to, but not limited to, side effects and build up of resistance. Advised if fever develops, symptoms worsen, or fail to improve with symptom management to return to office.    Steroid taper ordered to aid in resolution of s/s.

## 2024-02-19 NOTE — PROGRESS NOTES
Brianna Guzman (:  1970) is a 53 y.o. female,Established patient, here for evaluation of the following chief complaint(s):  URI (X 2 weeks) and Congestion (X 2 weeks)      ASSESSMENT/PLAN:  1. Colon cancer screening  Assessment & Plan:  Education provided regarding need for screening; referral sent.   Orders:  -     [facility] - Elba Lai MD, Gastroenterology ([facility]), [place]  2. Meningioma (HCC)  Assessment & Plan:  Managed by specialist, no acute findings noted to change treatment plan.     3. Viral URI  Assessment & Plan:  PCOT flu test negative.   Antibiotic not indicated, discussed rationale of not giving antibiotics due to, but not limited to, side effects and build up of resistance. Advised if fever develops, symptoms worsen, or fail to improve with symptom management to return to office.    Steroid taper ordered to aid in resolution of s/s.     She is due for a mammogram, this was scheduled today.   No follow-ups on file.    SUBJECTIVE/OBJECTIVE:    Pt reports to office with concerns of HA, congestion, and ear fullness ongoing for 2 days. Pt also reports chills, body aches, and fatigue. Pt denies sore throat, cough, SOB, and CP.     Pt reports recent contact with flu + family members 2 days prior.     Pt reports use of Sudafed and Mucinex without notable improvement.     No additional concerns per pt.     Current Outpatient Medications   Medication Sig Dispense Refill    predniSONE (DELTASONE) 20 MG tablet Take 1 tablet by mouth 2 times daily for 5 days 10 tablet 0    amLODIPine (NORVASC) 5 MG tablet Take 1 tablet by mouth daily 90 tablet 0    losartan (COZAAR) 25 MG tablet Take 1 tablet by mouth daily 90 tablet 1    Blood Pressure Monitoring (COMFORT TOUCH BP CUFF/LARGE) MISC Check bp daily 1 each 0     No current facility-administered medications for this visit.       Review of Systems   Constitutional:  Positive for chills and fatigue. Negative for fever.   HENT:  Positive for

## 2024-02-23 ENCOUNTER — OFFICE VISIT (OUTPATIENT)
Dept: ENT CLINIC | Age: 54
End: 2024-02-23

## 2024-02-23 ENCOUNTER — PREP FOR PROCEDURE (OUTPATIENT)
Dept: ENT CLINIC | Age: 54
End: 2024-02-23

## 2024-02-23 VITALS
BODY MASS INDEX: 33.68 KG/M2 | HEART RATE: 67 BPM | DIASTOLIC BLOOD PRESSURE: 88 MMHG | WEIGHT: 183 LBS | HEIGHT: 62 IN | TEMPERATURE: 97.5 F | SYSTOLIC BLOOD PRESSURE: 178 MMHG

## 2024-02-23 DIAGNOSIS — R49.9 HOARSENESS OR CHANGING VOICE: ICD-10-CM

## 2024-02-23 DIAGNOSIS — J38.1 VOCAL CORD POLYP: ICD-10-CM

## 2024-02-23 DIAGNOSIS — J38.1 VOCAL CORD POLYPS: ICD-10-CM

## 2024-02-23 DIAGNOSIS — R49.9 HOARSENESS OR CHANGING VOICE: Primary | ICD-10-CM

## 2024-02-23 NOTE — PROGRESS NOTES
FOLLOW UP VISIT:    CHIEF COMPLAINT: Vocal polyps.    INTERIM HISTORY: Seen by me in August 2023 with hoarseness of several years duration.  She has never smoked.  Fiberoptic evaluation of the upper aerodigestive tract at that time showed vocal cord polyps.  We had discussed surgery at that time but the patient had to cancel the procedure.  Her hoarseness persists.  She has no airway concerns.  She has never smoked.      PAST MEDICAL HISTORY:   Social History     Tobacco Use   Smoking Status Never   Smokeless Tobacco Never                                                    Social History     Substance and Sexual Activity   Alcohol Use Not Currently    Comment: occasionally                                                    Current Outpatient Medications:     predniSONE (DELTASONE) 20 MG tablet, Take 1 tablet by mouth 2 times daily for 5 days, Disp: 10 tablet, Rfl: 0    amLODIPine (NORVASC) 5 MG tablet, Take 1 tablet by mouth daily, Disp: 90 tablet, Rfl: 0    losartan (COZAAR) 25 MG tablet, Take 1 tablet by mouth daily, Disp: 90 tablet, Rfl: 1    Blood Pressure Monitoring (COMFORT TOUCH BP CUFF/LARGE) MISC, Check bp daily, Disp: 1 each, Rfl: 0                                                 Past Medical History:   Diagnosis Date    Hypertension     Vertigo     Viral URI 2/19/2024                                                    Past Surgical History:   Procedure Laterality Date    CARPAL TUNNEL RELEASE Bilateral        FAMILY HISTORY: Family history reviewed and except as pertinent to the interim history is not contributory.      REVIEW OF SYSTEMS:  All pertinent positive and negative findings included in HPI.  Otherwise, all other systems are reviewed and are negative    PHYSICAL EXAMINATION:   GENERAL: wdwn- no acute distress  RESPIRATORY: No stridor.  COMMUNICATION :  Normal voice  MENTAL STATUS: Alert and oriented x3  HEAD AND FACE: No skin lesions of the face.  EXTERNAL EARS AND NOSE: The external ears and

## 2024-02-26 ENCOUNTER — HOSPITAL ENCOUNTER (OUTPATIENT)
Dept: MAMMOGRAPHY | Age: 54
Discharge: HOME | End: 2024-02-26
Payer: COMMERCIAL

## 2024-02-26 DIAGNOSIS — Z12.31 ENCOUNTER FOR SCREENING MAMMOGRAM FOR MALIGNANT NEOPLASM OF BREAST: ICD-10-CM

## 2024-02-26 PROCEDURE — 77063 BREAST TOMOSYNTHESIS BI: CPT

## 2024-03-20 PROBLEM — Z12.11 COLON CANCER SCREENING: Status: RESOLVED | Noted: 2024-02-19 | Resolved: 2024-03-20

## 2024-03-25 ENCOUNTER — OFFICE VISIT (OUTPATIENT)
Dept: FAMILY MEDICINE CLINIC | Age: 54
End: 2024-03-25
Payer: COMMERCIAL

## 2024-03-25 VITALS
HEIGHT: 62 IN | TEMPERATURE: 98.6 F | SYSTOLIC BLOOD PRESSURE: 138 MMHG | OXYGEN SATURATION: 98 % | BODY MASS INDEX: 33.86 KG/M2 | DIASTOLIC BLOOD PRESSURE: 80 MMHG | WEIGHT: 184 LBS | HEART RATE: 86 BPM

## 2024-03-25 DIAGNOSIS — D32.9 MENINGIOMA (HCC): ICD-10-CM

## 2024-03-25 DIAGNOSIS — Z01.818 PREOP EXAMINATION: Primary | ICD-10-CM

## 2024-03-25 PROCEDURE — 99214 OFFICE O/P EST MOD 30 MIN: CPT | Performed by: NURSE PRACTITIONER

## 2024-03-25 PROCEDURE — 1036F TOBACCO NON-USER: CPT | Performed by: NURSE PRACTITIONER

## 2024-03-25 PROCEDURE — 3079F DIAST BP 80-89 MM HG: CPT | Performed by: NURSE PRACTITIONER

## 2024-03-25 PROCEDURE — 3017F COLORECTAL CA SCREEN DOC REV: CPT | Performed by: NURSE PRACTITIONER

## 2024-03-25 PROCEDURE — G8484 FLU IMMUNIZE NO ADMIN: HCPCS | Performed by: NURSE PRACTITIONER

## 2024-03-25 PROCEDURE — 3075F SYST BP GE 130 - 139MM HG: CPT | Performed by: NURSE PRACTITIONER

## 2024-03-25 PROCEDURE — G8427 DOCREV CUR MEDS BY ELIG CLIN: HCPCS | Performed by: NURSE PRACTITIONER

## 2024-03-25 PROCEDURE — G8417 CALC BMI ABV UP PARAM F/U: HCPCS | Performed by: NURSE PRACTITIONER

## 2024-03-25 ASSESSMENT — ENCOUNTER SYMPTOMS
EYE REDNESS: 0
SORE THROAT: 0
ABDOMINAL PAIN: 0
SHORTNESS OF BREATH: 0
BACK PAIN: 0
WHEEZING: 0
VOMITING: 0
DIARRHEA: 0
BLOOD IN STOOL: 0
PHOTOPHOBIA: 0
EYE PAIN: 0
EYE DISCHARGE: 0
CONSTIPATION: 0
STRIDOR: 0
COUGH: 0
SINUS PAIN: 0
NAUSEA: 0

## 2024-03-25 NOTE — PROGRESS NOTES
08/17/2023 42.3     Monocytes % 08/17/2023 6.9     Eosinophils % 08/17/2023 1.9     Basophils % 08/17/2023 0.6     Neutrophils Absolute 08/17/2023 4.7     Lymphocytes Absolute 08/17/2023 4.1     Monocytes Absolute 08/17/2023 0.7     Eosinophils Absolute 08/17/2023 0.2     Basophils Absolute 08/17/2023 0.1     Sodium 08/17/2023 142     Potassium 08/17/2023 3.6     Chloride 08/17/2023 106     CO2 08/17/2023 25     Anion Gap 08/17/2023 11     Glucose 08/17/2023 88     BUN 08/17/2023 9     Creatinine 08/17/2023 0.7     Est, Glom Filt Rate 08/17/2023 >60     Calcium 08/17/2023 9.8     Troponin, High Sensitivi* 08/17/2023 <6        Medication Review  Current Outpatient Medications on File Prior to Visit   Medication Sig Dispense Refill    amLODIPine (NORVASC) 5 MG tablet Take 1 tablet by mouth daily 90 tablet 0    losartan (COZAAR) 25 MG tablet Take 1 tablet by mouth daily 90 tablet 1    Blood Pressure Monitoring (COMFORT TOUCH BP CUFF/LARGE) MISC Check bp daily 1 each 0     No current facility-administered medications on file prior to visit.       Assessment:       1. Preop examination    2. Meningioma (HCC)           Plan:        Preop examination  Patient stable. No concerns at this time.      Meningioma (HCC)  Managed by specialist, no acute findings noted to change treatment plan

## 2024-03-25 NOTE — PROGRESS NOTES
3/25/24 1348 AM:  [facility]. PER [facility] AUDIT TRAIL, PROCEDURE PLACED ON [facility] SURGERY SCHEDULE  2/23/24. PRE-OP ORDERS/CONSENT TO BE ENTERED BY SURGEON'S OFFICE/TS.    LMOR TO REQUEST PRE-OP ORDERS/CONSENT TO BE ENTERED BY SURGEON/TS

## 2024-03-25 NOTE — PROGRESS NOTES
3/25/24 1420 PM:       [facility] PRE-SURGICAL TESTING INSTRUCTIONS                      PRIOR TO PROCEDURE DATE:    1. PLEASE FOLLOW ANY INSTRUCTIONS GIVEN TO YOU PER YOUR SURGEON.      2. Arrange for someone to drive you home and be with you for the first 24 hours after discharge for your safety after your procedure for which you received sedation. Ensure it is someone we can share information with regarding your discharge.     NOTE: At this time ONLY 2 ADULTS may accompany you. NO CHILDREN UNDER AGE OF 16.    One person ENCOURAGED to stay at hospital entire time if outpatient surgery      3. You must contact your surgeon for instructions IF:  You are taking any blood thinners, aspirin, anti-inflammatory or vitamins.   Contact your ordering physician/surgeon for medication instructions as soon as possible, especially if taking blood thinners, aspirin, heart, or diabetic medication. STOP SUPPLEMENTS/VITAMINS/NON-STEROIDAL ANTI-INFLAMMATORY MEDICATION 7 DAYS PRIOR TO PROCEDURE.  There is a change in your physical condition such as a cold, fever, rash, cuts, sores, or any other infection, especially near your surgical site.    4. Do not drink alcohol the day before or day of your procedure.  Do not use any recreational marijuana at least 24 hours or street drugs (heroin, cocaine) at minimum 5 days prior to your procedure.     5. A Pre-Surgical History and Physical MUST be completed WITHIN 30 DAYS OR LESS prior to your procedure.by your Physician or an Urgent Care        THE DAY OF YOUR PROCEDURE:  1.  Follow instructions for ARRIVAL TIME as DIRECTED BY YOUR SURGEON. [id number] [place], [place] [address]     2. Enter the MAIN entrance from [facility] and follow the signs to the free Parking Garage or  Parking (offered free of charge 7 am-5pm).      3. Enter the Main Entrance of the hospital (do not enter from the lower level of the parking garage). Upon entrance, check in with

## 2024-03-25 NOTE — PROGRESS NOTES
3/25/24 1359 PM:    H&P IN [facility] NOTES 3/25/2024, H&P REFERS TO LABS FROM [facility] 8/17/23/TS    NO EKG ORDERED FOR PREOP OR FOR H&P/TS     LAST EKG TRACING IN [facility] 8/17/2023/TS    TI COMPLETED/TS    MAY DRIVE SELF- VERBALIZED UNDERSTANING NEEDS RIDE HOME FROM FRIEND/FAMILY/CAREGIVER WHO CAN/WILLTAKE RESPONSIBILITY FOR DISCHARGE AFTER ANESTHESIA-CAN NOT DRIVE SELF HOME-RECOMMENDED TO HAVE POST OP CAREGIVER FOR 24 HOURS/TS

## 2024-04-01 ENCOUNTER — TELEPHONE (OUTPATIENT)
Dept: ENT CLINIC | Age: 54
End: 2024-04-01

## 2024-04-01 NOTE — TELEPHONE ENCOUNTER
Patient calling. She is upset because says she does not know the time of her surgery on Thursday. States she is anxious as she needs to make arrangements for transportation and care. Please advise patient.

## 2024-04-04 ENCOUNTER — ANESTHESIA EVENT (OUTPATIENT)
Dept: OPERATING ROOM | Age: 54
End: 2024-04-04
Payer: COMMERCIAL

## 2024-04-04 ENCOUNTER — ANESTHESIA (OUTPATIENT)
Dept: OPERATING ROOM | Age: 54
End: 2024-04-04
Payer: COMMERCIAL

## 2024-04-04 ENCOUNTER — HOSPITAL ENCOUNTER (OUTPATIENT)
Age: 54
Setting detail: OUTPATIENT SURGERY
Discharge: HOME | End: 2024-04-04
Attending: OTOLARYNGOLOGY | Admitting: OTOLARYNGOLOGY
Payer: COMMERCIAL

## 2024-04-04 VITALS
HEIGHT: 62 IN | WEIGHT: 189.4 LBS | TEMPERATURE: 96.8 F | BODY MASS INDEX: 34.85 KG/M2 | SYSTOLIC BLOOD PRESSURE: 121 MMHG | OXYGEN SATURATION: 96 % | RESPIRATION RATE: 16 BRPM | DIASTOLIC BLOOD PRESSURE: 69 MMHG | HEART RATE: 57 BPM

## 2024-04-04 DIAGNOSIS — J38.1 VOCAL CORD POLYP: ICD-10-CM

## 2024-04-04 DIAGNOSIS — R49.9 HOARSENESS OR CHANGING VOICE: ICD-10-CM

## 2024-04-04 PROCEDURE — 88341 IMHCHEM/IMCYTCHM EA ADD ANTB: CPT

## 2024-04-04 PROCEDURE — 88342 IMHCHEM/IMCYTCHM 1ST ANTB: CPT

## 2024-04-04 PROCEDURE — A4217 STERILE WATER/SALINE, 500 ML: HCPCS | Performed by: OTOLARYNGOLOGY

## 2024-04-04 PROCEDURE — 3700000000 HC ANESTHESIA ATTENDED CARE: Performed by: OTOLARYNGOLOGY

## 2024-04-04 PROCEDURE — 7100000010 HC PHASE II RECOVERY - FIRST 15 MIN: Performed by: OTOLARYNGOLOGY

## 2024-04-04 PROCEDURE — 7100000000 HC PACU RECOVERY - FIRST 15 MIN: Performed by: OTOLARYNGOLOGY

## 2024-04-04 PROCEDURE — 2709999900 HC NON-CHARGEABLE SUPPLY: Performed by: OTOLARYNGOLOGY

## 2024-04-04 PROCEDURE — 6360000002 HC RX W HCPCS: Performed by: ANESTHESIOLOGY

## 2024-04-04 PROCEDURE — 2580000003 HC RX 258: Performed by: ANESTHESIOLOGY

## 2024-04-04 PROCEDURE — 3600000003 HC SURGERY LEVEL 3 BASE: Performed by: OTOLARYNGOLOGY

## 2024-04-04 PROCEDURE — 3600000013 HC SURGERY LEVEL 3 ADDTL 15MIN: Performed by: OTOLARYNGOLOGY

## 2024-04-04 PROCEDURE — 2500000003 HC RX 250 WO HCPCS

## 2024-04-04 PROCEDURE — 3700000001 HC ADD 15 MINUTES (ANESTHESIA): Performed by: OTOLARYNGOLOGY

## 2024-04-04 PROCEDURE — 6360000002 HC RX W HCPCS

## 2024-04-04 PROCEDURE — 7100000001 HC PACU RECOVERY - ADDTL 15 MIN: Performed by: OTOLARYNGOLOGY

## 2024-04-04 PROCEDURE — 88305 TISSUE EXAM BY PATHOLOGIST: CPT

## 2024-04-04 PROCEDURE — 7100000011 HC PHASE II RECOVERY - ADDTL 15 MIN: Performed by: OTOLARYNGOLOGY

## 2024-04-04 PROCEDURE — 2580000003 HC RX 258: Performed by: OTOLARYNGOLOGY

## 2024-04-04 RX ORDER — LIDOCAINE HYDROCHLORIDE 20 MG/ML
INJECTION, SOLUTION INTRAVENOUS PRN
Status: DISCONTINUED | OUTPATIENT
Start: 2024-04-04 | End: 2024-04-04 | Stop reason: SDUPTHER

## 2024-04-04 RX ORDER — NALOXONE HYDROCHLORIDE 0.4 MG/ML
INJECTION, SOLUTION INTRAMUSCULAR; INTRAVENOUS; SUBCUTANEOUS PRN
Status: DISCONTINUED | OUTPATIENT
Start: 2024-04-04 | End: 2024-04-04 | Stop reason: HOSPADM

## 2024-04-04 RX ORDER — SODIUM CHLORIDE 9 MG/ML
INJECTION, SOLUTION INTRAVENOUS PRN
Status: DISCONTINUED | OUTPATIENT
Start: 2024-04-04 | End: 2024-04-04 | Stop reason: HOSPADM

## 2024-04-04 RX ORDER — PROPOFOL 10 MG/ML
INJECTION, EMULSION INTRAVENOUS PRN
Status: DISCONTINUED | OUTPATIENT
Start: 2024-04-04 | End: 2024-04-04 | Stop reason: SDUPTHER

## 2024-04-04 RX ORDER — OXYCODONE HYDROCHLORIDE AND ACETAMINOPHEN 5; 325 MG/1; MG/1
1 TABLET ORAL EVERY 4 HOURS PRN
Qty: 25 TABLET | Refills: 0 | Status: SHIPPED | OUTPATIENT
Start: 2024-04-04 | End: 2024-04-09

## 2024-04-04 RX ORDER — SODIUM CHLORIDE 0.9 % (FLUSH) 0.9 %
5-40 SYRINGE (ML) INJECTION EVERY 12 HOURS SCHEDULED
Status: DISCONTINUED | OUTPATIENT
Start: 2024-04-04 | End: 2024-04-04 | Stop reason: HOSPADM

## 2024-04-04 RX ORDER — MIDAZOLAM HYDROCHLORIDE 1 MG/ML
INJECTION INTRAMUSCULAR; INTRAVENOUS PRN
Status: DISCONTINUED | OUTPATIENT
Start: 2024-04-04 | End: 2024-04-04 | Stop reason: SDUPTHER

## 2024-04-04 RX ORDER — HYDROMORPHONE HYDROCHLORIDE 1 MG/ML
0.5 INJECTION, SOLUTION INTRAMUSCULAR; INTRAVENOUS; SUBCUTANEOUS EVERY 5 MIN PRN
Status: DISCONTINUED | OUTPATIENT
Start: 2024-04-04 | End: 2024-04-04 | Stop reason: HOSPADM

## 2024-04-04 RX ORDER — PROCHLORPERAZINE EDISYLATE 5 MG/ML
5 INJECTION INTRAMUSCULAR; INTRAVENOUS
Status: DISCONTINUED | OUTPATIENT
Start: 2024-04-04 | End: 2024-04-04 | Stop reason: HOSPADM

## 2024-04-04 RX ORDER — ESMOLOL HYDROCHLORIDE 10 MG/ML
INJECTION INTRAVENOUS PRN
Status: DISCONTINUED | OUTPATIENT
Start: 2024-04-04 | End: 2024-04-04 | Stop reason: SDUPTHER

## 2024-04-04 RX ORDER — SODIUM CHLORIDE 0.9 % (FLUSH) 0.9 %
5-40 SYRINGE (ML) INJECTION PRN
Status: DISCONTINUED | OUTPATIENT
Start: 2024-04-04 | End: 2024-04-04 | Stop reason: HOSPADM

## 2024-04-04 RX ORDER — MAGNESIUM HYDROXIDE 1200 MG/15ML
LIQUID ORAL CONTINUOUS PRN
Status: DISCONTINUED | OUTPATIENT
Start: 2024-04-04 | End: 2024-04-04 | Stop reason: HOSPADM

## 2024-04-04 RX ORDER — DIPHENHYDRAMINE HYDROCHLORIDE 50 MG/ML
12.5 INJECTION INTRAMUSCULAR; INTRAVENOUS
Status: DISCONTINUED | OUTPATIENT
Start: 2024-04-04 | End: 2024-04-04 | Stop reason: HOSPADM

## 2024-04-04 RX ORDER — MEPERIDINE HYDROCHLORIDE 25 MG/ML
12.5 INJECTION INTRAMUSCULAR; INTRAVENOUS; SUBCUTANEOUS EVERY 5 MIN PRN
Status: DISCONTINUED | OUTPATIENT
Start: 2024-04-04 | End: 2024-04-04 | Stop reason: HOSPADM

## 2024-04-04 RX ORDER — FENTANYL CITRATE 50 UG/ML
INJECTION, SOLUTION INTRAMUSCULAR; INTRAVENOUS PRN
Status: DISCONTINUED | OUTPATIENT
Start: 2024-04-04 | End: 2024-04-04 | Stop reason: SDUPTHER

## 2024-04-04 RX ORDER — LORAZEPAM 2 MG/ML
0.5 INJECTION INTRAMUSCULAR
Status: DISCONTINUED | OUTPATIENT
Start: 2024-04-04 | End: 2024-04-04 | Stop reason: HOSPADM

## 2024-04-04 RX ORDER — IPRATROPIUM BROMIDE AND ALBUTEROL SULFATE 2.5; .5 MG/3ML; MG/3ML
1 SOLUTION RESPIRATORY (INHALATION)
Status: DISCONTINUED | OUTPATIENT
Start: 2024-04-04 | End: 2024-04-04 | Stop reason: HOSPADM

## 2024-04-04 RX ORDER — DEXAMETHASONE SODIUM PHOSPHATE 4 MG/ML
INJECTION, SOLUTION INTRA-ARTICULAR; INTRALESIONAL; INTRAMUSCULAR; INTRAVENOUS; SOFT TISSUE PRN
Status: DISCONTINUED | OUTPATIENT
Start: 2024-04-04 | End: 2024-04-04 | Stop reason: SDUPTHER

## 2024-04-04 RX ORDER — LABETALOL HYDROCHLORIDE 5 MG/ML
INJECTION, SOLUTION INTRAVENOUS PRN
Status: DISCONTINUED | OUTPATIENT
Start: 2024-04-04 | End: 2024-04-04 | Stop reason: SDUPTHER

## 2024-04-04 RX ORDER — ONDANSETRON 2 MG/ML
4 INJECTION INTRAMUSCULAR; INTRAVENOUS
Status: DISCONTINUED | OUTPATIENT
Start: 2024-04-04 | End: 2024-04-04 | Stop reason: HOSPADM

## 2024-04-04 RX ORDER — FENTANYL CITRATE 50 UG/ML
25 INJECTION, SOLUTION INTRAMUSCULAR; INTRAVENOUS EVERY 5 MIN PRN
Status: DISCONTINUED | OUTPATIENT
Start: 2024-04-04 | End: 2024-04-04 | Stop reason: HOSPADM

## 2024-04-04 RX ORDER — ROCURONIUM BROMIDE 10 MG/ML
INJECTION, SOLUTION INTRAVENOUS PRN
Status: DISCONTINUED | OUTPATIENT
Start: 2024-04-04 | End: 2024-04-04 | Stop reason: SDUPTHER

## 2024-04-04 RX ORDER — SUCCINYLCHOLINE/SOD CL,ISO/PF 200MG/10ML
SYRINGE (ML) INTRAVENOUS PRN
Status: DISCONTINUED | OUTPATIENT
Start: 2024-04-04 | End: 2024-04-04 | Stop reason: SDUPTHER

## 2024-04-04 RX ORDER — DEXMEDETOMIDINE HYDROCHLORIDE 100 UG/ML
INJECTION, SOLUTION INTRAVENOUS PRN
Status: DISCONTINUED | OUTPATIENT
Start: 2024-04-04 | End: 2024-04-04 | Stop reason: SDUPTHER

## 2024-04-04 RX ORDER — LABETALOL HYDROCHLORIDE 5 MG/ML
10 INJECTION, SOLUTION INTRAVENOUS
Status: DISCONTINUED | OUTPATIENT
Start: 2024-04-04 | End: 2024-04-04 | Stop reason: HOSPADM

## 2024-04-04 RX ORDER — ONDANSETRON 2 MG/ML
INJECTION INTRAMUSCULAR; INTRAVENOUS PRN
Status: DISCONTINUED | OUTPATIENT
Start: 2024-04-04 | End: 2024-04-04 | Stop reason: SDUPTHER

## 2024-04-04 RX ORDER — SODIUM CHLORIDE, SODIUM LACTATE, POTASSIUM CHLORIDE, CALCIUM CHLORIDE 600; 310; 30; 20 MG/100ML; MG/100ML; MG/100ML; MG/100ML
INJECTION, SOLUTION INTRAVENOUS CONTINUOUS
Status: DISCONTINUED | OUTPATIENT
Start: 2024-04-04 | End: 2024-04-04 | Stop reason: HOSPADM

## 2024-04-04 RX ADMIN — SUGAMMADEX 200 MG: 100 INJECTION, SOLUTION INTRAVENOUS at 08:01

## 2024-04-04 RX ADMIN — MIDAZOLAM HYDROCHLORIDE 2 MG: 2 INJECTION, SOLUTION INTRAMUSCULAR; INTRAVENOUS at 07:28

## 2024-04-04 RX ADMIN — ROCURONIUM BROMIDE 5 MG: 10 INJECTION, SOLUTION INTRAVENOUS at 07:34

## 2024-04-04 RX ADMIN — DEXMEDETOMIDINE HYDROCHLORIDE 4 MCG: 100 INJECTION, SOLUTION INTRAVENOUS at 08:01

## 2024-04-04 RX ADMIN — FENTANYL CITRATE 100 MCG: 50 INJECTION, SOLUTION INTRAMUSCULAR; INTRAVENOUS at 07:34

## 2024-04-04 RX ADMIN — LIDOCAINE HYDROCHLORIDE 100 MG: 20 INJECTION, SOLUTION INTRAVENOUS at 08:09

## 2024-04-04 RX ADMIN — SODIUM CHLORIDE, POTASSIUM CHLORIDE, SODIUM LACTATE AND CALCIUM CHLORIDE: 600; 310; 30; 20 INJECTION, SOLUTION INTRAVENOUS at 06:30

## 2024-04-04 RX ADMIN — PROPOFOL 100 MG: 10 INJECTION, EMULSION INTRAVENOUS at 07:34

## 2024-04-04 RX ADMIN — DEXAMETHASONE SODIUM PHOSPHATE 8 MG: 4 INJECTION INTRA-ARTICULAR; INTRALESIONAL; INTRAMUSCULAR; INTRAVENOUS; SOFT TISSUE at 07:40

## 2024-04-04 RX ADMIN — SODIUM CHLORIDE, POTASSIUM CHLORIDE, SODIUM LACTATE AND CALCIUM CHLORIDE: 600; 310; 30; 20 INJECTION, SOLUTION INTRAVENOUS at 08:05

## 2024-04-04 RX ADMIN — FENTANYL CITRATE 25 MCG: 50 INJECTION INTRAMUSCULAR; INTRAVENOUS at 08:40

## 2024-04-04 RX ADMIN — ONDANSETRON 4 MG: 2 INJECTION INTRAMUSCULAR; INTRAVENOUS at 07:40

## 2024-04-04 RX ADMIN — SUGAMMADEX 100 MG: 100 INJECTION, SOLUTION INTRAVENOUS at 08:04

## 2024-04-04 RX ADMIN — LIDOCAINE HYDROCHLORIDE 100 MG: 20 INJECTION, SOLUTION INTRAVENOUS at 07:34

## 2024-04-04 RX ADMIN — DEXMEDETOMIDINE HYDROCHLORIDE 4 MCG: 100 INJECTION, SOLUTION INTRAVENOUS at 07:55

## 2024-04-04 RX ADMIN — LABETALOL HYDROCHLORIDE 5 MG: 5 INJECTION, SOLUTION INTRAVENOUS at 07:51

## 2024-04-04 RX ADMIN — ESMOLOL HYDROCHLORIDE 20 MG: 10 INJECTION, SOLUTION INTRAVENOUS at 07:45

## 2024-04-04 RX ADMIN — Medication 120 MG: at 07:34

## 2024-04-04 ASSESSMENT — PAIN SCALES - GENERAL
PAINLEVEL_OUTOF10: 0
PAINLEVEL_OUTOF10: 5
PAINLEVEL_OUTOF10: 2
PAINLEVEL_OUTOF10: 0

## 2024-04-04 ASSESSMENT — PAIN DESCRIPTION - PAIN TYPE
TYPE: ACUTE PAIN
TYPE: SURGICAL PAIN

## 2024-04-04 ASSESSMENT — PAIN DESCRIPTION - LOCATION: LOCATION: THROAT

## 2024-04-04 ASSESSMENT — PAIN DESCRIPTION - ONSET: ONSET: ON-GOING

## 2024-04-04 ASSESSMENT — PAIN DESCRIPTION - FREQUENCY
FREQUENCY: CONTINUOUS
FREQUENCY: CONTINUOUS

## 2024-04-04 ASSESSMENT — PAIN DESCRIPTION - DESCRIPTORS
DESCRIPTORS: ACHING;BURNING
DESCRIPTORS: SORE

## 2024-04-04 ASSESSMENT — PAIN DESCRIPTION - ORIENTATION
ORIENTATION: ANTERIOR;UPPER;RIGHT
ORIENTATION: POSTERIOR

## 2024-04-04 ASSESSMENT — PAIN - FUNCTIONAL ASSESSMENT: PAIN_FUNCTIONAL_ASSESSMENT: PREVENTS OR INTERFERES SOME ACTIVE ACTIVITIES AND ADLS

## 2024-04-04 NOTE — BRIEF OP NOTE
Brief Postoperative Note      Patient: Brianna Guzman  YOB: 1970  MRN: [id number]    Date of Procedure: 4/4/2024    Pre-Op Diagnosis Codes:     * Vocal cord polyp [J38.1]     * Hoarseness or changing voice [R49.9]    Post-Op Diagnosis: Same       Procedure(s):  MICROLARYNGOSCOPY AND REMOVAL OF BILATERAL  VOCAL CORD POLYPS    Surgeon(s):  Marciano Melendez MD    Assistant:  * No surgical staff found *    Anesthesia: General    Estimated Blood Loss (mL): Minimal    Complications: None    Specimens:   ID Type Source Tests Collected by Time Destination   A : A) VOCAL CORD POLYPS Tissue Tissue SURGICAL PATHOLOGY Marciano Melendez MD 4/4/2024 0751        Implants:  * No implants in log *      Drains: * No LDAs found *    Findings:  Infection Present At Time Of Surgery (PATOS) (choose all levels that have infection present):  No infection present      Electronically signed by Marciano Melendez MD on 4/4/2024 at 8:03 AM

## 2024-04-04 NOTE — PROGRESS NOTES
1695 Dr Melendez came to bedside on rounds examined oral cavity no treatments needed per MD the gum pain is normal

## 2024-04-04 NOTE — PROGRESS NOTES
PACU Transfer to [facility]    Vitals:    04/04/24 0900   BP: 129/81   Pulse: 58   Resp: 15   Temp: 97.2 °F (36.2 °C)   SpO2: 96%         Intake/Output Summary (Last 24 hours) at 4/4/2024 0906  Last data filed at 4/4/2024 0900  Gross per 24 hour   Intake 1420 ml   Output 5 ml   Net 1415 ml       Pain assessment:  present - adequately treated  Pain Level: 0    Patient transferred to care of [facility] RN.    4/4/2024 9:06 AM

## 2024-04-04 NOTE — PROGRESS NOTES
Pt CO pain Upper Left gum line no dentures in place, Small red spot pin size observed on exam upper Left gum line near front

## 2024-04-04 NOTE — PROGRESS NOTES
Pt arrived calm CO nasal congestion report received from CRNA and RN [facility] MICROLARYNGOSCOPY AND REMOVAL OF BILATERAL  VOCAL CORD POLYPS - Bilateral  General

## 2024-04-04 NOTE — OP NOTE
side.  The scope was removed.  The patient tolerated the procedure well and was transferred to the recovery room in good condition.    ESTIMATED BLOOD LOSS:  Minimal.          AJIT REYES MD      D:  04/04/2024 08:07:16     T:  04/04/2024 08:30:56     ALEXYS/STEFANO  Job #:  [id number]     Doc#:  [id number]

## 2024-04-04 NOTE — PROGRESS NOTES
Ambulatory Surgery/Procedure Discharge Note    Vitals:    04/04/24 0910   BP: 121/69   Pulse: 57   Resp: 16   Temp: 96.8 °F (36 °C)   SpO2: 96%       In: 1420 [P.O.:120; I.V.:1300]  Out: 5     Restroom use offered before discharge.  Yes    Pain assessment:  present - adequately treated  Pain Level: 2    Pt and family states \"ready to go home\". Pt alert and oriented x4. IV removed. Denies N/V or pain. Voided prior to discharge. Pt tolerating PO intake. Discharge instructions given to pt and family with pt permission. Pt and family verbalized understanding of all instructions. Left with all belongings, 1 prescription, and discharge instructions.     Patient discharged to home/self care. Patient discharged via wheel chair by transporter to waiting family.

## 2024-04-04 NOTE — ANESTHESIA POSTPROCEDURE EVALUATION
Department of Anesthesiology  Postprocedure Note    Patient: Brianna Guzman  MRN: [id number]  YOB: 1970  Date of evaluation: 4/4/2024    Procedure Summary       Date: 04/04/24 Room / Location: [address]    Anesthesia Start: 0728 Anesthesia Stop: 0817    Procedure: MICROLARYNGOSCOPY AND REMOVAL OF BILATERAL  VOCAL CORD POLYPS (Bilateral: Throat) Diagnosis:       Vocal cord polyp      Hoarseness or changing voice      (Vocal cord polyp [J38.1])      (Hoarseness or changing voice [R49.9])    Surgeons: Marciano Melendez MD Responsible Provider: Demetrius Wells MD    Anesthesia Type: General ASA Status: 2            Anesthesia Type: General    Gordo Phase I: Gordo Score: 9    Gordo Phase II:      Anesthesia Post Evaluation    Patient location during evaluation: PACU  Patient participation: complete - patient participated  Level of consciousness: awake  Airway patency: patent  Nausea & Vomiting: no nausea and no vomiting  Cardiovascular status: blood pressure returned to baseline and hemodynamically stable  Respiratory status: acceptable  Hydration status: euvolemic  Multimodal analgesia pain management approach  Pain management: adequate    No notable events documented.

## 2024-04-04 NOTE — DISCHARGE INSTRUCTIONS
Voice rest one to two days     No Diet restrictions     Call Dr Melendez's Office today at  for time and date of fallow [facility] AMBULATORY PROCEDURE DISCHARGE INSTRUCTIONS    There are potential side effects of anesthesia or sedation you may experience for the first 24 hours.  These side effects include:    Confusion or Memory loss, Dizziness, or Delayed Reaction Times   [x]A responsible person should be with you for the next 24 hours.  Do not operate any vehicles (automobiles, bicycles, motorcycles) or power tools or machinery for 24 hours.  Do not sign any legal documents or make any legal decisions for 24 hours. Do not drink alcohol for 24 hours or while taking narcotic pain medication.      Nausea    [x]Start with light diet and progress to your normal diet as you feel like eating. However, if you experience nausea or repeated episodes of vomiting which persist beyond 12-24 hours, notify your physician.  Once nausea has passed, remember to keep drinking fluids.    Difficulty Passing Urine  [x]Drink extra amounts of fluid today.  Notify your physician if you have not urinated within 8 hours after your procedure or you feel uncomfortable.      Irritated Throat from a Breathing Tube  [x]Drink extra amounts of fluid today.  Lozenges may help.    Muscle Aches  [x]You may experience some generalized body aches as your muscles recover from medications used to relax them during surgery.  These will gradually subside.    MEDICATION INSTRUCTIONS:  [x]Prescription(S) x   oxycodone  sent with you.  Use as directed.  When taking pain medications, you may experience the side effect of dizziness or drowsiness.  Do not drink alcohol or drive when taking these medications.      [x]Give the list of your medications to your primary care physician on your next visit. Keep your med list updated and carry it with in case of emergencies.    [x] Narcotic pain medications can cause the side effect of

## 2024-04-04 NOTE — ANESTHESIA PRE PROCEDURE
(URI)    • Snores    • Vertigo    • Viral URI 02/19/2024   • Vocal cord polyp    • Wears dentures    • Wears glasses        Past Surgical History:        Procedure Laterality Date   • CARPAL TUNNEL RELEASE Bilateral        Social History:    Social History     Tobacco Use   • Smoking status: Never   • Smokeless tobacco: Never   Substance Use Topics   • Alcohol use: Not Currently     Comment: occasionally                                Counseling given: Not Answered      Vital Signs (Current):   Vitals:    03/25/24 1404 04/04/24 0603   BP:  (!) 138/91   Pulse:  82   Resp:  14   Temp:  98.4 °F (36.9 °C)   TempSrc:  Temporal   SpO2:  97%   Weight: 85.3 kg (188 lb) 85.9 kg (189 lb 6.4 oz)   Height: 1.575 m (5' 2\") 1.575 m (5' 2.01\")                                              BP Readings from Last 3 Encounters:   04/04/24 (!) 138/91   03/25/24 138/80   02/23/24 (!) 178/88       NPO Status: Time of last liquid consumption: 2350                        Time of last solid consumption: 2350                        Date of last liquid consumption: 04/03/24                        Date of last solid food consumption: 04/03/24    BMI:   Wt Readings from Last 3 Encounters:   04/04/24 85.9 kg (189 lb 6.4 oz)   03/25/24 83.5 kg (184 lb)   02/23/24 83 kg (183 lb)     Body mass index is 34.63 kg/m².    CBC:   Lab Results   Component Value Date/Time    WBC 9.7 08/17/2023 08:46 PM    RBC 4.18 08/17/2023 08:46 PM    HGB 12.5 08/17/2023 08:46 PM    HCT 37.7 08/17/2023 08:46 PM    MCV 90.3 08/17/2023 08:46 PM    RDW 14.8 08/17/2023 08:46 PM     08/17/2023 08:46 PM       CMP:   Lab Results   Component Value Date/Time     08/17/2023 08:46 PM    K 3.6 08/17/2023 08:46 PM     08/17/2023 08:46 PM    CO2 25 08/17/2023 08:46 PM    BUN 9 08/17/2023 08:46 PM    CREATININE 0.7 08/17/2023 08:46 PM    GFRAA >60 10/11/2021 11:19 AM    AGRATIO 1.5 03/07/2023 12:32 PM    LABGLOM >60 08/17/2023 08:46 PM    GLUCOSE 88 08/17/2023 08:46

## 2024-04-24 PROBLEM — Z01.818 PREOP EXAMINATION: Status: RESOLVED | Noted: 2024-03-25 | Resolved: 2024-04-24

## 2024-08-05 RX ORDER — LOSARTAN POTASSIUM 25 MG/1
25 TABLET ORAL DAILY
Qty: 90 TABLET | Refills: 1 | Status: SHIPPED | OUTPATIENT
Start: 2024-08-05

## 2024-08-07 RX ORDER — AMLODIPINE BESYLATE 5 MG/1
5 TABLET ORAL DAILY
Qty: 90 TABLET | Refills: 0 | Status: SHIPPED | OUTPATIENT
Start: 2024-08-07

## 2024-08-07 NOTE — TELEPHONE ENCOUNTER
Last appointment: 3/25/2024  Next appointment: Visit date not found  Last refill: 1/23/24  Requested Prescriptions     Pending Prescriptions Disp Refills    amLODIPine (NORVASC) 5 MG tablet [Pharmacy Med Name: AMLODIPINE BESYLATE 5MG TABLETS] 90 tablet 0     Sig: TAKE 1 TABLET BY MOUTH DAILY

## 2024-12-04 RX ORDER — AMLODIPINE BESYLATE 5 MG/1
5 TABLET ORAL DAILY
Qty: 90 TABLET | Refills: 0 | Status: SHIPPED | OUTPATIENT
Start: 2024-12-04

## 2024-12-04 NOTE — TELEPHONE ENCOUNTER
Medication:   Requested Prescriptions     Pending Prescriptions Disp Refills    amLODIPine (NORVASC) 5 MG tablet [Pharmacy Med Name: AMLODIPINE BESYLATE 5MG TABLETS] 90 tablet 0     Sig: TAKE 1 TABLET BY MOUTH DAILY     Last Filled:      Last appt: 3/25/2024   Next appt: Visit date not found    Last OARRS:        No data to display

## 2025-04-03 RX ORDER — LOSARTAN POTASSIUM 25 MG/1
25 TABLET ORAL DAILY
Qty: 90 TABLET | Refills: 1 | Status: SHIPPED | OUTPATIENT
Start: 2025-04-03

## 2025-04-03 RX ORDER — AMLODIPINE BESYLATE 5 MG/1
5 TABLET ORAL DAILY
Qty: 90 TABLET | Refills: 1 | Status: SHIPPED | OUTPATIENT
Start: 2025-04-03

## 2025-04-03 NOTE — PROGRESS NOTES
Medication:   Requested Prescriptions     Pending Prescriptions Disp Refills    amLODIPine (NORVASC) 5 MG tablet 90 tablet 1     Sig: Take 1 tablet by mouth daily    losartan (COZAAR) 25 MG tablet 90 tablet 1     Sig: Take 1 tablet by mouth daily        Last Filled:  12/4/24 8/5/24    Patient Phone Number: [phone] (home)     Last appt: 3/25/2024   Next appt: Visit date not found    Last OARRS:        No data to display

## 2025-09-05 ENCOUNTER — HOSPITAL ENCOUNTER (EMERGENCY)
Age: 55
Discharge: HOME | End: 2025-09-05
Attending: EMERGENCY MEDICINE

## 2025-09-05 VITALS
DIASTOLIC BLOOD PRESSURE: 92 MMHG | HEART RATE: 84 BPM | RESPIRATION RATE: 16 BRPM | WEIGHT: 197.9 LBS | TEMPERATURE: 98.6 F | SYSTOLIC BLOOD PRESSURE: 189 MMHG | BODY MASS INDEX: 36.42 KG/M2 | HEIGHT: 62 IN | OXYGEN SATURATION: 99 %

## 2025-09-05 DIAGNOSIS — S61.411A LACERATION OF RIGHT HAND WITHOUT FOREIGN BODY, INITIAL ENCOUNTER: Primary | ICD-10-CM

## (undated) DEVICE — GUARD TEETH AD PR NYL

## (undated) DEVICE — ENT MINOR: Brand: MEDLINE INDUSTRIES, INC.

## (undated) DEVICE — GLOVE ORANGE PI 7   MSG9070

## (undated) DEVICE — TOWEL,STOP FLAG GOLD N-W: Brand: MEDLINE

## (undated) DEVICE — TOWEL,OR,DSP,ST,BLUE,DLX,8/PK,10PK/CS: Brand: MEDLINE

## (undated) DEVICE — JEWISH HOSPITAL TURNOVER KIT: Brand: MEDLINE INDUSTRIES, INC.

## (undated) DEVICE — SOLUTION IV 1000ML 0.9% SOD CHL